# Patient Record
Sex: FEMALE | Race: WHITE | NOT HISPANIC OR LATINO | Employment: OTHER | URBAN - METROPOLITAN AREA
[De-identification: names, ages, dates, MRNs, and addresses within clinical notes are randomized per-mention and may not be internally consistent; named-entity substitution may affect disease eponyms.]

---

## 2020-08-24 ENCOUNTER — HOSPITAL ENCOUNTER (INPATIENT)
Facility: CLINIC | Age: 85
LOS: 3 days | Discharge: HOME HEALTH | DRG: 176 | End: 2020-08-27
Attending: INTERNAL MEDICINE | Admitting: HOSPITALIST
Payer: MEDICARE

## 2020-08-24 ENCOUNTER — APPOINTMENT (OUTPATIENT)
Dept: GENERAL RADIOLOGY | Facility: CLINIC | Age: 85
DRG: 176 | End: 2020-08-24
Attending: EMERGENCY MEDICINE
Payer: MEDICARE

## 2020-08-24 ENCOUNTER — APPOINTMENT (OUTPATIENT)
Dept: CT IMAGING | Facility: CLINIC | Age: 85
DRG: 176 | End: 2020-08-24
Attending: EMERGENCY MEDICINE
Payer: MEDICARE

## 2020-08-24 DIAGNOSIS — M62.81 GENERALIZED MUSCLE WEAKNESS: Primary | ICD-10-CM

## 2020-08-24 DIAGNOSIS — I26.99 OTHER PULMONARY EMBOLISM WITHOUT ACUTE COR PULMONALE, UNSPECIFIED CHRONICITY (H): ICD-10-CM

## 2020-08-24 LAB
ALBUMIN SERPL-MCNC: 3.7 G/DL (ref 3.4–5)
ALP SERPL-CCNC: 92 U/L (ref 40–150)
ALT SERPL W P-5'-P-CCNC: 16 U/L (ref 0–50)
ANION GAP SERPL CALCULATED.3IONS-SCNC: 7 MMOL/L (ref 3–14)
AST SERPL W P-5'-P-CCNC: 22 U/L (ref 0–45)
BASOPHILS # BLD AUTO: 0.1 10E9/L (ref 0–0.2)
BASOPHILS NFR BLD AUTO: 0.3 %
BILIRUB DIRECT SERPL-MCNC: 0.5 MG/DL (ref 0–0.2)
BILIRUB SERPL-MCNC: 2.2 MG/DL (ref 0.2–1.3)
BUN SERPL-MCNC: 15 MG/DL (ref 7–30)
CALCIUM SERPL-MCNC: 9.8 MG/DL (ref 8.5–10.1)
CHLORIDE SERPL-SCNC: 103 MMOL/L (ref 94–109)
CO2 SERPL-SCNC: 25 MMOL/L (ref 20–32)
CREAT SERPL-MCNC: 0.83 MG/DL (ref 0.52–1.04)
D DIMER PPP FEU-MCNC: 1 UG/ML FEU (ref 0–0.5)
DIFFERENTIAL METHOD BLD: ABNORMAL
EOSINOPHIL # BLD AUTO: 0 10E9/L (ref 0–0.7)
EOSINOPHIL NFR BLD AUTO: 0.1 %
ERYTHROCYTE [DISTWIDTH] IN BLOOD BY AUTOMATED COUNT: 13.9 % (ref 10–15)
GFR SERPL CREATININE-BSD FRML MDRD: 62 ML/MIN/{1.73_M2}
GLUCOSE SERPL-MCNC: 105 MG/DL (ref 70–99)
HCT VFR BLD AUTO: 41.1 % (ref 35–47)
HGB BLD-MCNC: 12.9 G/DL (ref 11.7–15.7)
IMM GRANULOCYTES # BLD: 0.1 10E9/L (ref 0–0.4)
IMM GRANULOCYTES NFR BLD: 0.4 %
INR PPP: 1.02 (ref 0.86–1.14)
LIPASE SERPL-CCNC: 103 U/L (ref 73–393)
LYMPHOCYTES # BLD AUTO: 1.1 10E9/L (ref 0.8–5.3)
LYMPHOCYTES NFR BLD AUTO: 6.8 %
MCH RBC QN AUTO: 30.1 PG (ref 26.5–33)
MCHC RBC AUTO-ENTMCNC: 31.4 G/DL (ref 31.5–36.5)
MCV RBC AUTO: 96 FL (ref 78–100)
MONOCYTES # BLD AUTO: 1.5 10E9/L (ref 0–1.3)
MONOCYTES NFR BLD AUTO: 8.7 %
NEUTROPHILS # BLD AUTO: 14.1 10E9/L (ref 1.6–8.3)
NEUTROPHILS NFR BLD AUTO: 83.7 %
NRBC # BLD AUTO: 0 10*3/UL
NRBC BLD AUTO-RTO: 0 /100
NT-PROBNP SERPL-MCNC: 840 PG/ML (ref 0–1800)
PLATELET # BLD AUTO: 208 10E9/L (ref 150–450)
POTASSIUM SERPL-SCNC: 3.8 MMOL/L (ref 3.4–5.3)
PROT SERPL-MCNC: 8.8 G/DL (ref 6.8–8.8)
RBC # BLD AUTO: 4.29 10E12/L (ref 3.8–5.2)
SODIUM SERPL-SCNC: 135 MMOL/L (ref 133–144)
TROPONIN I SERPL-MCNC: <0.015 UG/L (ref 0–0.04)
WBC # BLD AUTO: 16.8 10E9/L (ref 4–11)

## 2020-08-24 PROCEDURE — U0003 INFECTIOUS AGENT DETECTION BY NUCLEIC ACID (DNA OR RNA); SEVERE ACUTE RESPIRATORY SYNDROME CORONAVIRUS 2 (SARS-COV-2) (CORONAVIRUS DISEASE [COVID-19]), AMPLIFIED PROBE TECHNIQUE, MAKING USE OF HIGH THROUGHPUT TECHNOLOGIES AS DESCRIBED BY CMS-2020-01-R: HCPCS | Performed by: EMERGENCY MEDICINE

## 2020-08-24 PROCEDURE — 12000000 ZZH R&B MED SURG/OB

## 2020-08-24 PROCEDURE — C9803 HOPD COVID-19 SPEC COLLECT: HCPCS

## 2020-08-24 PROCEDURE — 96376 TX/PRO/DX INJ SAME DRUG ADON: CPT

## 2020-08-24 PROCEDURE — 71275 CT ANGIOGRAPHY CHEST: CPT

## 2020-08-24 PROCEDURE — 85610 PROTHROMBIN TIME: CPT | Performed by: HOSPITALIST

## 2020-08-24 PROCEDURE — 25000128 H RX IP 250 OP 636: Performed by: EMERGENCY MEDICINE

## 2020-08-24 PROCEDURE — 25000128 H RX IP 250 OP 636: Performed by: PHYSICIAN ASSISTANT

## 2020-08-24 PROCEDURE — 99285 EMERGENCY DEPT VISIT HI MDM: CPT | Mod: 25

## 2020-08-24 PROCEDURE — 80076 HEPATIC FUNCTION PANEL: CPT | Performed by: INTERNAL MEDICINE

## 2020-08-24 PROCEDURE — 25000132 ZZH RX MED GY IP 250 OP 250 PS 637: Mod: GY | Performed by: PHYSICIAN ASSISTANT

## 2020-08-24 PROCEDURE — 25000128 H RX IP 250 OP 636: Performed by: INTERNAL MEDICINE

## 2020-08-24 PROCEDURE — 85379 FIBRIN DEGRADATION QUANT: CPT | Performed by: INTERNAL MEDICINE

## 2020-08-24 PROCEDURE — 36415 COLL VENOUS BLD VENIPUNCTURE: CPT | Performed by: PHYSICIAN ASSISTANT

## 2020-08-24 PROCEDURE — 71045 X-RAY EXAM CHEST 1 VIEW: CPT

## 2020-08-24 PROCEDURE — 99223 1ST HOSP IP/OBS HIGH 75: CPT | Mod: AI | Performed by: PHYSICIAN ASSISTANT

## 2020-08-24 PROCEDURE — 25000132 ZZH RX MED GY IP 250 OP 250 PS 637: Mod: GY | Performed by: HOSPITALIST

## 2020-08-24 PROCEDURE — 25000125 ZZHC RX 250: Performed by: EMERGENCY MEDICINE

## 2020-08-24 PROCEDURE — 93005 ELECTROCARDIOGRAM TRACING: CPT | Mod: 76

## 2020-08-24 PROCEDURE — 96365 THER/PROPH/DIAG IV INF INIT: CPT | Mod: 59

## 2020-08-24 PROCEDURE — 84145 PROCALCITONIN (PCT): CPT | Performed by: PHYSICIAN ASSISTANT

## 2020-08-24 PROCEDURE — 83880 ASSAY OF NATRIURETIC PEPTIDE: CPT | Performed by: INTERNAL MEDICINE

## 2020-08-24 PROCEDURE — 85025 COMPLETE CBC W/AUTO DIFF WBC: CPT | Performed by: INTERNAL MEDICINE

## 2020-08-24 PROCEDURE — 84484 ASSAY OF TROPONIN QUANT: CPT | Performed by: INTERNAL MEDICINE

## 2020-08-24 PROCEDURE — 80048 BASIC METABOLIC PNL TOTAL CA: CPT | Performed by: INTERNAL MEDICINE

## 2020-08-24 PROCEDURE — 85610 PROTHROMBIN TIME: CPT | Performed by: INTERNAL MEDICINE

## 2020-08-24 PROCEDURE — 96375 TX/PRO/DX INJ NEW DRUG ADDON: CPT

## 2020-08-24 PROCEDURE — 93005 ELECTROCARDIOGRAM TRACING: CPT

## 2020-08-24 PROCEDURE — 83690 ASSAY OF LIPASE: CPT | Performed by: INTERNAL MEDICINE

## 2020-08-24 RX ORDER — WARFARIN SODIUM 2.5 MG/1
2.5 TABLET ORAL ONCE
Status: COMPLETED | OUTPATIENT
Start: 2020-08-25 | End: 2020-08-24

## 2020-08-24 RX ORDER — IOPAMIDOL 755 MG/ML
500 INJECTION, SOLUTION INTRAVASCULAR ONCE
Status: COMPLETED | OUTPATIENT
Start: 2020-08-24 | End: 2020-08-24

## 2020-08-24 RX ORDER — ACETAMINOPHEN 325 MG/1
325-650 TABLET ORAL EVERY 6 HOURS PRN
COMMUNITY

## 2020-08-24 RX ORDER — AMOXICILLIN 250 MG
2 CAPSULE ORAL 2 TIMES DAILY
Status: DISCONTINUED | OUTPATIENT
Start: 2020-08-24 | End: 2020-08-27 | Stop reason: HOSPADM

## 2020-08-24 RX ORDER — HEPARIN SODIUM 10000 [USP'U]/100ML
1000 INJECTION, SOLUTION INTRAVENOUS CONTINUOUS
Status: DISCONTINUED | OUTPATIENT
Start: 2020-08-24 | End: 2020-08-25

## 2020-08-24 RX ORDER — LIDOCAINE 40 MG/G
CREAM TOPICAL
Status: DISCONTINUED | OUTPATIENT
Start: 2020-08-24 | End: 2020-08-24

## 2020-08-24 RX ORDER — ACETAMINOPHEN 325 MG/1
650 TABLET ORAL EVERY 4 HOURS PRN
Status: DISCONTINUED | OUTPATIENT
Start: 2020-08-24 | End: 2020-08-27 | Stop reason: HOSPADM

## 2020-08-24 RX ORDER — AMOXICILLIN 250 MG
1 CAPSULE ORAL 2 TIMES DAILY
Status: DISCONTINUED | OUTPATIENT
Start: 2020-08-24 | End: 2020-08-27 | Stop reason: HOSPADM

## 2020-08-24 RX ORDER — MORPHINE SULFATE 4 MG/ML
2 INJECTION, SOLUTION INTRAMUSCULAR; INTRAVENOUS ONCE
Status: COMPLETED | OUTPATIENT
Start: 2020-08-24 | End: 2020-08-24

## 2020-08-24 RX ORDER — NALOXONE HYDROCHLORIDE 0.4 MG/ML
.1-.4 INJECTION, SOLUTION INTRAMUSCULAR; INTRAVENOUS; SUBCUTANEOUS
Status: DISCONTINUED | OUTPATIENT
Start: 2020-08-24 | End: 2020-08-25

## 2020-08-24 RX ORDER — MORPHINE SULFATE 2 MG/ML
2-4 INJECTION, SOLUTION INTRAMUSCULAR; INTRAVENOUS
Status: DISCONTINUED | OUTPATIENT
Start: 2020-08-24 | End: 2020-08-25

## 2020-08-24 RX ADMIN — SODIUM CHLORIDE 73 ML: 9 INJECTION, SOLUTION INTRAVENOUS at 18:14

## 2020-08-24 RX ADMIN — MORPHINE SULFATE 2 MG: 4 INJECTION INTRAVENOUS at 19:02

## 2020-08-24 RX ADMIN — HEPARIN SODIUM 1000 UNITS/HR: 10000 INJECTION, SOLUTION INTRAVENOUS at 19:23

## 2020-08-24 RX ADMIN — MORPHINE SULFATE 2 MG: 2 INJECTION, SOLUTION INTRAMUSCULAR; INTRAVENOUS at 22:23

## 2020-08-24 RX ADMIN — DOCUSATE SODIUM AND SENNOSIDES 1 TABLET: 8.6; 5 TABLET, FILM COATED ORAL at 22:25

## 2020-08-24 RX ADMIN — IOPAMIDOL 52 ML: 755 INJECTION, SOLUTION INTRAVENOUS at 18:14

## 2020-08-24 RX ADMIN — WARFARIN SODIUM 2.5 MG: 2.5 TABLET ORAL at 23:47

## 2020-08-24 RX ADMIN — MORPHINE SULFATE 2 MG: 4 INJECTION INTRAVENOUS at 15:47

## 2020-08-24 RX ADMIN — Medication 4600 UNITS: at 19:23

## 2020-08-24 ASSESSMENT — ACTIVITIES OF DAILY LIVING (ADL)
RETIRED_COMMUNICATION: 0-->UNDERSTANDS/COMMUNICATES WITHOUT DIFFICULTY
RETIRED_EATING: 0-->INDEPENDENT
COGNITION: 0 - NO COGNITION ISSUES REPORTED
TOILETING: 0-->INDEPENDENT
SWALLOWING: 0-->SWALLOWS FOODS/LIQUIDS WITHOUT DIFFICULTY
NUMBER_OF_TIMES_PATIENT_HAS_FALLEN_WITHIN_LAST_SIX_MONTHS: 2
AMBULATION: 1-->ASSISTIVE EQUIPMENT
TRANSFERRING: 0-->INDEPENDENT
FALL_HISTORY_WITHIN_LAST_SIX_MONTHS: YES
WHICH_OF_THE_ABOVE_FUNCTIONAL_RISKS_HAD_A_RECENT_ONSET_OR_CHANGE?: AMBULATION
DRESS: 0-->INDEPENDENT
BATHING: 0-->INDEPENDENT

## 2020-08-24 ASSESSMENT — ENCOUNTER SYMPTOMS
NEUROLOGICAL NEGATIVE: 1
SORE THROAT: 0
FEVER: 0
COUGH: 0
BACK PAIN: 0
GASTROINTESTINAL NEGATIVE: 1

## 2020-08-24 NOTE — ED TRIAGE NOTES
Pt developed sharp right chest pain last nigh tbefore bed.  Pt says the pain is worse with breathing and radiates to the back.  Pt reports that she was awake more than asleep last night due to the pain.

## 2020-08-24 NOTE — ED PROVIDER NOTES
"  History   Chief Complaint  Chest Pain    HPI   Geni Lindsay is a 89 year old female who presents for evaluation of sharp, right sided chest pain. She states her pain started before she went to bed yesterday, with increase in pain through the night that disturbed her sleep. She denies any recent fall or history of this pain. She notes that her pain is worsened with inspiration and reports significant pain that, \"takes her breath away.\" She denies any radiation to her back. She states the pain is constant. She denies any fevers, cough, sore throat, leg swelling. She states she is tender at the location of her pain which is reproducible.    Patient notes that she moved here form [place] 2 years ago and notes that she had been seen at [facility] once for hearing. While in [place], she had an abnormal ECG and she was told \"her heart doesn't pump as well as it should,\" however notes that she has never been diagnosed or placed on medications for any condition.    Allergies  Sulfa Drugs    Medications  The patient is currently on no regular medications.    Past Medical History  The patient denies any significant past medical history.    Past Surgical History  Tonsillectomy  D and C of Uterus    Family History  No past pertinent family history.    Social History  Tobacco use: Never smoker  Alcohol use: Not currently  Drug use: Never  Marital Status: Not reviewed    Review of Systems   Constitutional: Negative for fever.   HENT: Negative for sore throat.    Respiratory: Negative for cough.    Cardiovascular: Positive for chest pain (worse with inhalation). Negative for leg swelling.   Gastrointestinal: Negative.    Genitourinary: Negative.    Musculoskeletal: Negative for back pain.   Neurological: Negative.    All other systems reviewed and are negative.    Physical Exam     Patient Vitals for the past 24 hrs:   BP Temp Temp src Pulse Resp SpO2 Height Weight   08/24/20 1900 (!) 168/73 -- -- -- " "-- -- 1.651 m (5' 5\") --   08/24/20 1830 -- -- -- 93 -- 98 % -- --   08/24/20 1800 (!) 140/69 -- -- 78 -- 97 % -- --   08/24/20 1730 (!) 145/67 -- -- 87 -- 97 % -- --   08/24/20 1700 (!) 153/65 -- -- 90 -- 97 % -- --   08/24/20 1630 (!) 156/78 -- -- 87 -- 98 % -- --   08/24/20 1600 (!) 166/78 -- -- 89 -- 96 % -- --   08/24/20 1530 (!) 178/96 -- -- 92 -- 100 % -- --   08/24/20 1357 (!) 166/78 99.3  F (37.4  C) Oral 105 16 97 % -- 58 kg (127 lb 13.9 oz)       Physical Exam    General: Patient is alert and interactive when I enter the room, uncomfortable   Head:  The scalp, face, and head appear normal  Eyes:  Conjunctivae are normal  ENT:    The nose is normal    Pinnae are normal    External acoustic canals are normal  Neck:  Trachea midline  CV:  Pulses are normal, RRR    Resp:  No respiratory distress, CTAB    Abdomen:      Soft, non-tender, non-distended  Musc:  Normal muscular tone    No major joint effusions    No asymmetric leg swelling    Tenderness under right breast  Skin:  No rash or lesions noted  Neuro:  Speech is normal and fluent. Face is symmetric.     Moving all extremities well.   Psych: Awake. Alert.  Normal affect.  Appropriate interactions.      Emergency Department Course   EKG  Indication: Chest Pain  Time: 1415  Rate 90 bpm. IN interval 114. QRS duration 80. QT/QTc 348/425.   Normal sinus rhythm with sinus arrhythmia  ST and T wave abnormality, consider anterolateral  Abnormal ECG   No prior for comparison  Read time: 1416  Read by Marcia Boyer MD    EKG #2  Indication: Chest Pain  Time: 1605  Rate 93 bpm. IN interval 126. QRS duration 80. QT/QTc 348/432.   Normal sinus rhythm  ST and T wave abnormality, consider inferior ischemia  ST and T wave abnormality, consider anterolateral ischemia   Abnormal EKG  No prior for comparison  Read time 1606  Read by Marcia Boyer MD    Imaging:  Radiology findings were communicated with the patient who voiced understanding of the findings.    CT " Chest Pulmonary Embolism w Contrast  IMPRESSION:  1.  Pulmonary embolism is present, minimal clot burden with single embolus in the right middle lobe. No evidence for elevated right heart pressure. There may be some underlying pre-existing pulmonary hypertension.  2.  Small right pleural effusion and right middle lobe consolidation suspicious for pulmonary infarct. Pneumonia considered less likely.    XR Chest Port 1 View  IMPRESSION: Coarsened interstitial markings not remarkable for age. No  lobar consolidation, pneumothorax, or pleural effusion.    Readings per Radiology    Laboratory:  Laboratory findings were communicated with the patient who voiced understanding of the findings.    CBC: WBC: 16.8 (high), HGB: 12.9, PLT: 208  CMP: Glucose 105 (high), o/w WNL (Creatinine: 0.83)  Hepatic Panel: Bilirubin Direct: 0.5 (high), Bilirubin Total: 2.2 (high), o/w WNL  Lipase: 103  D dimer: 1.0 (high)  Troponin I (Collected at 1531): <0.015  BNP: 840    Heparin 10A level (x3): standing    Asymptomatic COVID-19 Virus: pending    Interventions:  1547 morphine 2 mg IV  1902 morphine 2 mg IV  1923 heparin 25,000 units IV  1923 heparin 4,600 units IV    Emergency Department Course:  Past medical records, nursing notes, and vitals reviewed.    1600 I physically examined the patient as documented above.    A Nasopharyngeal swab was obtained here in the ED.  EKG obtained in the ED, see results above.   IV was inserted and blood was drawn for laboratory testing, results above.  The patient was sent for radiographs while in the emergency department, results above.     1800 I rechecked the patient and discussed the findings of their workup thus far.     1849 I consulted with Radiology, regarding the patient's history and presentation here in the emergency department. I was informed of a detected PE.    1904 Findings and plan explained to the Patient who consents to admission. Discussed the patient with Dr. Krueger, Hospitalist, who  will admit the patient to a inpatient medicine bed for further monitoring, evaluation, and treatment.    I personally reviewed the laboratory and imaging results with the Patient and answered all related questions prior to admission.     Impression & Plan   Covid-19  Geni Lindsay was evaluated during a global COVID-19 pandemic, which necessitated consideration that the patient might be at risk for infection with the SARS-CoV-2 virus that causes COVID-19.   Applicable protocols for evaluation were followed during the patient's care.   COVID-19 was considered as part of the patient's evaluation. The plan for testing is:  a test was obtained during this visit.    Medical Decision Making:  Geni Lindsay is a 89 year old female who presents for evaluation of chest pain.  ECG here shows lateral T wave inversions however unclear if these are old or new.. Blood work of the patient returned largely reassuring aside from elevated an elevated D dimer to 1.0 and an elevated white blood cell count to 16.8. Subsequent chest CT revealed a right middle lobe embolus.  He does also have a pulmonary infarct on CT.  No clinical symptoms of pneumonia.  Patient remained hemodynamically stable and pain improved after morphine.  This was discussed with the patient. Patient was started on heparin.. I will admit the patient to medicine team for further workup and evaluation.  There is no indication at this point for thrombolysis.      Critical Care Time: was 0 minutes for this patient excluding procedures    Diagnosis:    ICD-10-CM    1. Other pulmonary embolism without acute cor pulmonale, unspecified chronicity (H)  I26.99 Asymptomatic COVID-19 Virus (Coronavirus) by PCR       Disposition:  Admitted to inpatient medicine.    Scribe Disclosure:  MARY JANE, Minh Lobo, am serving as a scribe at 4:00 PM on 8/24/2020 to document services personally performed by Marcia Boyer MD based on my observations and the provider's statements  to me.      Marcia Boyer MD  08/24/20 [id number]

## 2020-08-24 NOTE — ED NOTES
"Pt began calling out in pain, moaning and groaning, exclaiming \"it's these huge boobs.\" Pt was repositioned for comfort, reports pain is worse when not sitting upright. Repeat EKG taken. Provider notified.  "

## 2020-08-25 ENCOUNTER — APPOINTMENT (OUTPATIENT)
Dept: CARDIOLOGY | Facility: CLINIC | Age: 85
DRG: 176 | End: 2020-08-25
Attending: PHYSICIAN ASSISTANT
Payer: MEDICARE

## 2020-08-25 ENCOUNTER — APPOINTMENT (OUTPATIENT)
Dept: PHYSICAL THERAPY | Facility: CLINIC | Age: 85
DRG: 176 | End: 2020-08-25
Attending: PHYSICIAN ASSISTANT
Payer: MEDICARE

## 2020-08-25 LAB
ALBUMIN SERPL-MCNC: 2.7 G/DL (ref 3.4–5)
ALP SERPL-CCNC: 80 U/L (ref 40–150)
ALT SERPL W P-5'-P-CCNC: 12 U/L (ref 0–50)
ANION GAP SERPL CALCULATED.3IONS-SCNC: 4 MMOL/L (ref 3–14)
AST SERPL W P-5'-P-CCNC: 16 U/L (ref 0–45)
BILIRUB DIRECT SERPL-MCNC: 0.3 MG/DL (ref 0–0.2)
BILIRUB SERPL-MCNC: 1.4 MG/DL (ref 0.2–1.3)
BUN SERPL-MCNC: 13 MG/DL (ref 7–30)
CALCIUM SERPL-MCNC: 8.6 MG/DL (ref 8.5–10.1)
CHLORIDE SERPL-SCNC: 104 MMOL/L (ref 94–109)
CO2 SERPL-SCNC: 27 MMOL/L (ref 20–32)
CREAT SERPL-MCNC: 0.89 MG/DL (ref 0.52–1.04)
ERYTHROCYTE [DISTWIDTH] IN BLOOD BY AUTOMATED COUNT: 14 % (ref 10–15)
GFR SERPL CREATININE-BSD FRML MDRD: 56 ML/MIN/{1.73_M2}
GLUCOSE SERPL-MCNC: 95 MG/DL (ref 70–99)
HCT VFR BLD AUTO: 35.8 % (ref 35–47)
HGB BLD-MCNC: 11.1 G/DL (ref 11.7–15.7)
INR PPP: 1.28 (ref 0.86–1.14)
INTERPRETATION ECG - MUSE: NORMAL
INTERPRETATION ECG - MUSE: NORMAL
LMWH PPP CHRO-ACNC: 0.52 IU/ML
LMWH PPP CHRO-ACNC: 0.54 IU/ML
LMWH PPP CHRO-ACNC: 0.79 IU/ML
MCH RBC QN AUTO: 30.2 PG (ref 26.5–33)
MCHC RBC AUTO-ENTMCNC: 31 G/DL (ref 31.5–36.5)
MCV RBC AUTO: 98 FL (ref 78–100)
PLATELET # BLD AUTO: 171 10E9/L (ref 150–450)
POTASSIUM SERPL-SCNC: 3.9 MMOL/L (ref 3.4–5.3)
PROCALCITONIN SERPL-MCNC: 0.1 NG/ML
PROT SERPL-MCNC: 6.7 G/DL (ref 6.8–8.8)
RBC # BLD AUTO: 3.67 10E12/L (ref 3.8–5.2)
SODIUM SERPL-SCNC: 135 MMOL/L (ref 133–144)
WBC # BLD AUTO: 14.8 10E9/L (ref 4–11)

## 2020-08-25 PROCEDURE — 25000128 H RX IP 250 OP 636: Performed by: PHYSICIAN ASSISTANT

## 2020-08-25 PROCEDURE — 25000128 H RX IP 250 OP 636: Performed by: HOSPITALIST

## 2020-08-25 PROCEDURE — 97530 THERAPEUTIC ACTIVITIES: CPT | Mod: GP | Performed by: PHYSICAL THERAPIST

## 2020-08-25 PROCEDURE — 85610 PROTHROMBIN TIME: CPT | Performed by: PHYSICIAN ASSISTANT

## 2020-08-25 PROCEDURE — 25000132 ZZH RX MED GY IP 250 OP 250 PS 637: Mod: GY | Performed by: INTERNAL MEDICINE

## 2020-08-25 PROCEDURE — 85027 COMPLETE CBC AUTOMATED: CPT | Performed by: PHYSICIAN ASSISTANT

## 2020-08-25 PROCEDURE — 99233 SBSQ HOSP IP/OBS HIGH 50: CPT | Performed by: INTERNAL MEDICINE

## 2020-08-25 PROCEDURE — 80048 BASIC METABOLIC PNL TOTAL CA: CPT | Performed by: PHYSICIAN ASSISTANT

## 2020-08-25 PROCEDURE — 97116 GAIT TRAINING THERAPY: CPT | Mod: GP | Performed by: PHYSICAL THERAPIST

## 2020-08-25 PROCEDURE — 85520 HEPARIN ASSAY: CPT | Performed by: PHYSICIAN ASSISTANT

## 2020-08-25 PROCEDURE — 25500064 ZZH RX 255 OP 636: Performed by: HOSPITALIST

## 2020-08-25 PROCEDURE — 36415 COLL VENOUS BLD VENIPUNCTURE: CPT | Performed by: PHYSICIAN ASSISTANT

## 2020-08-25 PROCEDURE — 93306 TTE W/DOPPLER COMPLETE: CPT | Mod: 26 | Performed by: INTERNAL MEDICINE

## 2020-08-25 PROCEDURE — 85520 HEPARIN ASSAY: CPT | Performed by: HOSPITALIST

## 2020-08-25 PROCEDURE — 25000132 ZZH RX MED GY IP 250 OP 250 PS 637: Mod: GY | Performed by: PHYSICIAN ASSISTANT

## 2020-08-25 PROCEDURE — 97161 PT EVAL LOW COMPLEX 20 MIN: CPT | Mod: GP | Performed by: PHYSICAL THERAPIST

## 2020-08-25 PROCEDURE — 40000264 ECHOCARDIOGRAM COMPLETE

## 2020-08-25 PROCEDURE — 36415 COLL VENOUS BLD VENIPUNCTURE: CPT | Performed by: HOSPITALIST

## 2020-08-25 PROCEDURE — 12000000 ZZH R&B MED SURG/OB

## 2020-08-25 PROCEDURE — 80076 HEPATIC FUNCTION PANEL: CPT | Performed by: PHYSICIAN ASSISTANT

## 2020-08-25 RX ORDER — HEPARIN SODIUM 10000 [USP'U]/100ML
950 INJECTION, SOLUTION INTRAVENOUS CONTINUOUS
Status: DISCONTINUED | OUTPATIENT
Start: 2020-08-25 | End: 2020-08-25

## 2020-08-25 RX ORDER — NALOXONE HYDROCHLORIDE 0.4 MG/ML
.1-.4 INJECTION, SOLUTION INTRAMUSCULAR; INTRAVENOUS; SUBCUTANEOUS
Status: DISCONTINUED | OUTPATIENT
Start: 2020-08-25 | End: 2020-08-27 | Stop reason: HOSPADM

## 2020-08-25 RX ORDER — IBUPROFEN 400 MG/1
400 TABLET, FILM COATED ORAL EVERY 4 HOURS PRN
Status: DISCONTINUED | OUTPATIENT
Start: 2020-08-25 | End: 2020-08-27 | Stop reason: HOSPADM

## 2020-08-25 RX ADMIN — Medication 2.5 MG: at 21:49

## 2020-08-25 RX ADMIN — HUMAN ALBUMIN MICROSPHERES AND PERFLUTREN 3 ML: 10; .22 INJECTION, SOLUTION INTRAVENOUS at 15:00

## 2020-08-25 RX ADMIN — ACETAMINOPHEN 650 MG: 325 TABLET, FILM COATED ORAL at 16:55

## 2020-08-25 RX ADMIN — MORPHINE SULFATE 2 MG: 2 INJECTION, SOLUTION INTRAMUSCULAR; INTRAVENOUS at 04:17

## 2020-08-25 RX ADMIN — MORPHINE SULFATE 2 MG: 2 INJECTION, SOLUTION INTRAMUSCULAR; INTRAVENOUS at 12:35

## 2020-08-25 RX ADMIN — RIVAROXABAN 15 MG: 15 TABLET, FILM COATED ORAL at 16:55

## 2020-08-25 RX ADMIN — DOCUSATE SODIUM AND SENNOSIDES 2 TABLET: 8.6; 5 TABLET, FILM COATED ORAL at 21:49

## 2020-08-25 ASSESSMENT — ACTIVITIES OF DAILY LIVING (ADL)
ADLS_ACUITY_SCORE: 15
ADLS_ACUITY_SCORE: 14
ADLS_ACUITY_SCORE: 15
ADLS_ACUITY_SCORE: 14

## 2020-08-25 ASSESSMENT — MIFFLIN-ST. JEOR: SCORE: 1001.03

## 2020-08-25 NOTE — ED NOTES
"[facility]  ED Nurse Handoff Report    Geni Lindsay is a 89 year old female   ED Chief complaint: Chest Pain  . ED Diagnosis:   Final diagnoses:   Other pulmonary embolism without acute cor pulmonale, unspecified chronicity (H)     Allergies:   Allergies   Allergen Reactions     Sulfa Drugs        Code Status: Full Code  Activity level - Baseline/Home:  Stand by Assist. Activity Level - Current:   Assist X 1. Lift room needed: No. Bariatric: No   Needed: No   Isolation: No. Infection: COVID19 pending     Vital Signs:   Vitals:    08/24/20 1730 08/24/20 1800 08/24/20 1830 08/24/20 1900   BP: (!) 145/67 (!) 140/69     Pulse: 87 78 93    Resp:       Temp:       TempSrc:       SpO2: 97% 97% 98%    Weight:       Height:    1.651 m (5' 5\")       Cardiac Rhythm:  ,   Cardiac  Cardiac Rhythm: Normal sinus rhythm  Pain level: 0-10 Pain Scale: 9  Patient confused: Yes. Patient Falls Risk: Yes.   Elimination Status: Has voided   Patient Report - Initial Complaint: Chest Pain. Focused Assessment: Pt presents with chest pain that started last night. Pt reports she spent the afternoon shredding papers while sitting at the edge of the bed. Pain came on suddenly prior to going to sleep. Pt took Tylenol without relief, and unable to sleep. Pain is constant with intermittent episodes of sharp pain on right side of chest, worse with deep breaths. ABCs intact, A&Ox4.   Tests Performed:   Labs Ordered and Resulted from Time of ED Arrival Up to the Time of Departure from the ED   CBC WITH PLATELETS DIFFERENTIAL - Abnormal; Notable for the following components:       Result Value    WBC 16.8 (*)     MCHC 31.4 (*)     Absolute Neutrophil 14.1 (*)     Absolute Monocytes 1.5 (*)     All other components within normal limits   BASIC METABOLIC PANEL - Abnormal; Notable for the following components:    Glucose 105 (*)     All other components within normal limits   HEPATIC PANEL - Abnormal; Notable for the " following components:    Bilirubin Direct 0.5 (*)     Bilirubin Total 2.2 (*)     All other components within normal limits   D DIMER QUANTITATIVE - Abnormal; Notable for the following components:    D Dimer 1.0 (*)     All other components within normal limits   TROPONIN I   LIPASE   NT PROBNP INPATIENT   COVID-19 VIRUS (CORONAVIRUS) BY PCR   PLATELETS MONITORED PER HEPARIN TREATMENT PROTOCOL (FOR MEANINGFUL USE   PULSE OXIMETRY NURSING   CARDIAC CONTINUOUS MONITORING   PERIPHERAL IV CATHETER   MEASURE WEIGHT   NOTIFY PHYSICIAN   NOTIFY PHYSICIAN     CT Chest Pulmonary Embolism w Contrast   Final Result   IMPRESSION:   1.  Pulmonary embolism is present, minimal clot burden with single embolus in the right middle lobe. No evidence for elevated right heart pressure. There may be some underlying pre-existing pulmonary hypertension.   2.  Small right pleural effusion and right middle lobe consolidation suspicious for pulmonary infarct. Pneumonia considered less likely.   3.  Results were discussed with Dr. Marcia Boyer at 6:49 PM 08/24/2020.      XR Chest Port 1 View   Final Result   IMPRESSION: Coarsened interstitial markings not remarkable for age. No   lobar consolidation, pneumothorax, or pleural effusion.      CHAR LARIOS MD        Treatments provided: IV morphine, IV Heparin (see MAR)  Family Comments: Niece at bedside  OBS brochure/video discussed/provided to patient:  Yes  ED Medications:   Medications   heparin 25,000 units in 0.45% NaCl 250 mL ANTICOAGULANT  infusion (has no administration in time range)   heparin ANTICOAGULANT  Loading Dose bolus dose from infusion pump 4,600 Units (has no administration in time range)   morphine (PF) injection 2 mg (2 mg Intravenous Given 8/24/20 1547)   iopamidol (ISOVUE-370) solution 500 mL (52 mLs Intravenous Given 8/24/20 1814)   CT Scan Flush (73 mLs Intravenous Given 8/24/20 1814)   morphine (PF) injection 2 mg (2 mg Intravenous Given 8/24/20 1902)     Drips  infusing:  No  For the majority of the shift, the patient's behavior Green. Interventions performed were none.    Sepsis treatment initiated: No       ED Nurse Name/Phone Number: Sola Arshad RN,   7:10 PM    RECEIVING UNIT ED HANDOFF REVIEW    Above ED Nurse Handoff Report was reviewed: Yes  Reviewed by: Poppy Rodríguez RN on August 24, 2020 at 8:32 PM

## 2020-08-25 NOTE — PHARMACY-ADMISSION MEDICATION HISTORY
Admission medication history interview status for this patient is complete. See [facility] admission navigator for allergy information, prior to admission medications and immunization status.     Medication history interview done via telephone during Covid-19 pandemic, indicate source(s): Patient  Medication history resources (including written lists, pill bottles, clinic record): Care Everywhere  Pharmacy: none  Changes made to PTA medication list:  Added: acetaminophen  Deleted: -  Changed: -    Actions taken by pharmacist (provider contacted, etc): Called pt to verify home med list     Additional medication history information: Pt only takes APAP PRN at home.    Medication reconciliation/reorder completed by provider prior to medication history?  N   (Y/N)     For patients on insulin therapy: N  (Y/N)      Prior to Admission medications    Medication Sig Last Dose Taking? Auth Provider   acetaminophen (TYLENOL) 325 MG tablet Take 325-650 mg by mouth every 6 hours as needed for mild pain 8/24/2020 at am Yes Unknown, Entered By History

## 2020-08-25 NOTE — PLAN OF CARE
Pt is a/o, up with A1 and walker. VSS. Pain in right chest area, gave morphine x1- no relief. Appetite good. Voiding. Heparin running at 9.5 ml/hr. Plan to have echo today. Discharge TBD

## 2020-08-25 NOTE — PHARMACY-ANTICOAGULATION SERVICE
Clinical Pharmacy - Warfarin Dosing Consult     Pharmacy has been consulted to manage this patient s warfarin therapy.  Indication: DVT/ PE Treatment  Therapy Goal: INR 2-3  Warfarin Prior to Admission: No  Significant drug interactions: Heparin drip  Dose Comments: 2.5mg [facility]   Pharmacy will monitor Geni Lindsay daily and order warfarin doses to achieve specified goal.      Please contact pharmacy as soon as possible if the warfarin needs to be held for a procedure or if the warfarin goals change.

## 2020-08-25 NOTE — H&P
History and Physical     Geni Lindsay MRN# [id number]   YOB: 1929 Age: 89 year old      Date of Admission:  8/24/2020    Primary care provider: No Ref-Primary, Physician          Assessment and Plan:   Geni Lindsay is a 89 year old female who is pretty spry with no significant PMH, on no medications, who presents with acute complaint of right-sided pleuritic chest pain that started yesterday.  She is a decent historian and states that she had been in normal state of health although she did have a mechanical fall possibly 6 weeks ago.  States she has been getting around okay and has been trying to use a walker for stability.  She was doing well until yesterday when she bent over and had acute right-sided chest pain and pleuritic symptoms.  Due to the ongoing symptoms she presented to the emergency room for further evaluation.  Work-up in the emergency room reveals mild hypertension, white count elevated 16.8K, and low-grade temperature of 99.5. CT scan of the chest shows positive right middle lobe infiltrate with concerns for pulmonary infarct and less likely infiltrate.  Patient was started on heparin drip and was admitted to the hospital for further cares.    1.  Acute right middle lobe pulmonary embolism with concerns for possible pulmonary infarction --cause of PE unclear.  Patient did state she fell 6 weeks ago and was not moving as well after so might be related to period of immobility.  Currently calves are supple and nontender, does not appear to have DVT.  No previous history in the past for DVT PE.  --Continue IV heparin for now, given her decreased gait stability, discussed with her and her niece regarding starting warfarin rather than NOAC due to ease of quick reversibility with vitamin K.  (Patient and niece agrees)  --Pharmacy to dose heparin as well as transition to warfarin dosing  --Consider outpatient follow-up with hematology    2.  Leukocytosis-she has not had any fever  "chills URI symptoms to suggest viral or bacterial infection/PNA.  Suspect elevated  WBC due to acute PE with pulmonary infarction.   --We will hold IV antibiotics for now,  check procalcitonin  --Follow labs  --Consider starting antibiotics and check blood cultures if she does start to spike temperature    3.  EKG changes-EKG changes possible ST-T wave changes in the inferior lateral leads.  No other cardiac history is known as the patient moved from [place] 2 years ago.  -- Patient denies CAD, but did state that they told me that \"my heart does not beat well\"  --Troponin is negative, so not suspicious for RV heart strain  --Given EKG finding will get baseline echocardiogram     4.  Mildly elevated total bili-unclear significance.  No abdominal pain repeat LFTs in a.m.    5. Concern for possible gait instability--Will ask PT for eval.   6. Nose lesion--crusty lesion at the top of her nose, will need outpt Derm follow for BX to r/o cancer    Admit to inpatient status  CODE STATUS-DNR/DNI discussed with patient  DVT prophylaxis--currently on heparin drip with transition to warfarin   Disposition-unclear likely will need a couple of days         Chief Complaint:   Right sided cp       History of Present Illness:   Geni Lindsay is a 89 year old female who is pretty spry with no significant PMH, on no medications, who presents with acute complaint of right-sided pleuritic chest pain that started yesterday.  She is a decent historian and states that she had been in normal state of health although she did have a mechanical fall possibly 6 weeks ago.  States she has been getting around okay and has been trying to use a walker for stability.  She was doing well until yesterday when she bent over (while looking over her tax papers) and experienced acute right-sided chest pain and pleuritic symptoms.  States it hurts when she takes a deep breath, no fever or chills. Feels like a knife hitting into her. No hx of " DVT/PE. No recent trauma except falling and sprang her rigth ankle 6-8 weeks ago. Does not believe she was laid up too long.      Due to the ongoing symptoms she presented to the emergency room for further evaluation.  Work-up in the emergency room reveals mild hypertension, white count elevated 16.8K, and low-grade temperature of 99.5. CT scan of the chest shows positive right middle lobe infiltrate with concerns for pulmonary infarct and less likely infiltrate.  Patient was started on heparin drip and was admitted to the hospital for further cares.  She moved here from [place] 2 yrs ago after her  . She has been to [facility] once since he moved here.          Past Medical History:   Denies any chronic medical illness          Past Surgical History:   Reviewed and no contributory          Social History:     Social History     Socioeconomic History     Marital status:      Spouse name: Not on file     Number of children: Not on file     Years of education: Not on file     Highest education level: Not on file   Occupational History     Not on file   Social Needs     Financial resource strain: Not on file     Food insecurity     Worry: Not on file     Inability: Not on file     Transportation needs     Medical: Not on file     Non-medical: Not on file   Tobacco Use     Smoking status: Not on file   Substance and Sexual Activity     Alcohol use: Not on file     Drug use: Not on file     Sexual activity: Not on file   Lifestyle     Physical activity     Days per week: Not on file     Minutes per session: Not on file     Stress: Not on file   Relationships     Social connections     Talks on phone: Not on file     Gets together: Not on file     Attends [employer] service: Not on file     Active member of club or organization: Not on file     Attends meetings of clubs or organizations: Not on file     Relationship status: Not on file     Intimate partner violence     Fear of current or ex partner: Not on file      "Emotionally abused: Not on file     Physically abused: Not on file     Forced sexual activity: Not on file   Other Topics Concern     Not on file   Social History Narrative     Not on file               Family History:   No family history on file.           Allergies:      Allergies   Allergen Reactions     Sulfa Drugs                Medications:     Prior to Admission medications    Medication Sig Last Dose Taking? Auth Provider   acetaminophen (TYLENOL) 325 MG tablet Take 325-650 mg by mouth every 6 hours as needed for mild pain 8/24/2020 at am Yes Unknown, Entered By History              Review of Systems:     A Comprehensive greater than 10 system review of systems was carried out.  Pertinent positives and negatives are noted above.  Otherwise negative for contributory information.            Physical Exam:   Blood pressure (!) 150/56, pulse 87, temperature 100.3  F (37.9  C), temperature source Oral, resp. rate 16, height 1.651 m (5' 5\"), weight 58 kg (127 lb 13.9 oz), SpO2 95 %.  Exam:  GENERAL:  Comfortable.  PSYCH: pleasant, oriented, No acute distress.  HEENT:  PERRLA. Normal conjunctiva, normal hearing, nasal mucosa and Oropharynx are normal.  NECK:  Supple, no neck vein distention, adenopathy or bruits, normal thyroid.  HEART:  Normal S1, S2 with no murmur, no pericardial rub, gallops or S3 or S4.  LUNGS:  Clear to auscultation, normal Respiratory effort. No wheezing, rales or ronchi.  ABDOMEN:  Soft, no hepatosplenomegaly, normal bowel sounds. Non-tender, non distended.   EXTREMITIES:  No pedal edema, +2 pulses bilateral and equal.  SKIN:  Dry to touch, No rash, wound or ulcerations.  NEUROLOGIC:  CN 2-12 intact, BL 5/5 symmetric upper and lower extremity strength, sensation is intact with no focal deficits.             Data:     Recent Labs   Lab 08/24/20  1531   WBC 16.8*   HGB 12.9   HCT 41.1   MCV 96        Recent Labs   Lab 08/24/20  1531      POTASSIUM 3.8   CHLORIDE 103   CO2 25 "   ANIONGAP 7   *   BUN 15   CR 0.83   GFRESTIMATED 62   GFRESTBLACK 72   DOMINIC 9.8     Recent Labs   Lab 08/24/20  1531   INR 1.02     Recent Labs   Lab 08/24/20  1531   TROPI <0.015         Results for orders placed or performed during the hospital encounter of 08/24/20   XR Chest Port 1 View    Narrative    CHEST ONE VIEW PORTABLE   8/24/2020 4:30 PM     HISTORY: Chest pain.    COMPARISON: None.      Impression    IMPRESSION: Coarsened interstitial markings not remarkable for age. No  lobar consolidation, pneumothorax, or pleural effusion.    CHAR LARIOS MD   CT Chest Pulmonary Embolism w Contrast    Narrative    EXAM: CT CHEST PULMONARY EMBOLISM W CONTRAST  LOCATION: [facility]  DATE/TIME: 8/24/2020 6:09 PM    INDICATION:  positive D-dimer  See the Clinical Information for Interpreting Provider  COMPARISON: None.  TECHNIQUE: CT chest pulmonary angiogram during arterial phase injection of IV contrast. Multiplanar reformats and MIP reconstructions were performed. Dose reduction techniques were used.   CONTRAST: 52mL Isovue-370    FINDINGS:  ANGIOGRAM CHEST: Pulmonary embolism is present, single embolus in the right middle lobe and medial segment pulmonary arterial branches. Overall minimal clot burden. Pulmonary arteries mildly dilated. Thoracic aorta normal in caliber. No aortic dissection   or other acute abnormality.    HEART: Mild four-chamber cardiomegaly. No pericardial effusion.    LUNGS AND PLEURA: Small right pleural effusion and adjacent atelectasis. Mild groundglass density within the right middle lobe suspicious for infarct. No pulmonary mass. No suspicious pulmonary nodule. No pleural effusion or pneumothorax.    MEDIASTINUM: No adenopathy or mass.    LIMITED UPPER ABDOMEN: Negative.    MUSCULOSKELETAL: Diffuse osteopenia. Prominent dorsal kyphosis. Mild thoracic scoliosis.      Impression    IMPRESSION:  1.  Pulmonary embolism is present, minimal clot burden with single embolus  in the right middle lobe. No evidence for elevated right heart pressure. There may be some underlying pre-existing pulmonary hypertension.  2.  Small right pleural effusion and right middle lobe consolidation suspicious for pulmonary infarct. Pneumonia considered less likely.  3.  Results were discussed with Dr. Marcia Boyer at 6:49 PM 08/24/2020.         Jada Klein PA-C

## 2020-08-25 NOTE — PLAN OF CARE
"BP (!) 150/56   Pulse 87   Temp 100.3  F (37.9  C) (Oral)   Resp 16   Ht 1.651 m (5' 5\")   Wt 58 kg (127 lb 13.9 oz)   SpO2 95%   BMI 21.28 kg/m    ORIENTATION:A/Ox4  VS:Tmax 100.3  PAIN:6/10 rt. Rib cage- PRN morphine given x1  TELE:SR  IV:hep gtt infusing @ 10 ml/hr  RESPIRATORY:LS- diminished, SOB  GI:+BS  :Voiding appropriately  SKIN:Bruised, scabs   ACTIVITY:SBA gait belt and walker  DIET:Regular  PLAN: Continue Hep gtt, pain control, continue POC.  "

## 2020-08-25 NOTE — PLAN OF CARE
End of Shift Note:  For VS and complete assessments, please see documentation flowsheets.    COVID status: PENDING     Pertinent shift assessments: A&Ox4. Pt c/o right sided pleuritic pain only with deep inspiration. LS with fine crackles in RM/RLL. Pt reports some ALSTON. No chest pressure reported. On hep gtt tonight. Slept well tonight. Pulse ox audible w/ sats >92% on room air. Tele SR with inverted T waves. VSS. Scab noted on bridge of nose. Pt independently repositions in bed. Pt up the bathroom with GB and walker this shift, pain much more intense with movement. IV morphine given x1 w/ relief.    Treatment Plan: Hep gtt, improve pain, ECHO    Expected Discharge Date/Disposition: TBD

## 2020-08-25 NOTE — PROGRESS NOTES
"[facility]  Hospitalist Progress Note  Naveen Esquivel MD 08/25/2020    Reason for Stay (Diagnosis): pulmonary embolism         Assessment and Plan:      Summary of Stay: Geni Lindsay is a 89 year old female who came to attention on 8/24/2020 with right sided chest pain that had onset the day before coming in.     In the ED, Ms. Lindsay was hemodynamically stable and without hypoxia breathing room air. Her examination was negative for clear pathology, but bilirubin was 2.2, WBC 16.8 with left shift, D-dimer 1.0, EKG revealed T-wave inversions over the entire precordium.  CT chest \"Pulmonary embolism is present, minimal clot burden with single embolus in the right middle lobe. No evidence for elevated right heart pressure. There may be some underlying pre-existing pulmonary hypertension...Small right pleural effusion and right middle lobe consolidation suspicious for pulmonary infarct. Pneumonia considered less likely.\"    Ms Lindsay was admitted on a heparin drip and has tolerated this well.  I spoke with her niece, Mylene, today and got some corroborating information.      Problem List:   1. Pulmonary embolism with pulm infarction.  Likely precipitated by recent fall and period of less-than-usual activity.  2. Falls.   3. Likely basal cell cancer on the nose.    PLAN:  1.  Continues on heparin.  Request Pharmacy liaison to look for appropriate DOAC coverage. Rivaroxaban 15 mg bid may be slightly generous for this patient; for that reason and considering her obvious stability, I have opted to start her on a maintenance dose of 20 mg daily without the initial 15 mg twice daily.  Expect 3-6 months anticoagulation.  2.  No need for heme eval at this time.    3.  Ibuprofen and oxycodone are ordered for pain control.    DVT Prophylaxis: on heparin drip transitioning to DOAC  Code Status: DNR / DNI  Discharge Dispo: tbd  Estimated Disch Date / # of Days until Disch: 1-2 likely        Interval History " "(Subjective):      Chart reviewed, pt interviewed.    I found the patient to be somewhat difficult to communicate with.  Her niece, who happened to arrive at the same time as I did indicated that she also thought the patient was mildly confused beyond her baseline.      According to Mylene, the niece, Ms. Lindsay is independent and seems to be doing very well in independent living.  She takes care of her aunt bills and medications.  She has not had difficulty with ambulating typically.    Patient tells me that she was awakened multiple times in the night due to chest pain.                  Physical Exam:      Last Vital Signs:  /41 (BP Location: Left arm)   Pulse 74   Temp 97.5  F (36.4  C) (Oral)   Resp 20   Ht 1.651 m (5' 5\")   Wt 58 kg (127 lb 14.4 oz)   SpO2 96%   BMI 21.28 kg/m        Constitutional: Awake, alert, cooperative, no apparent distress   Respiratory: Clear to auscultation bilaterally, no crackles or wheezing.  Pain limits deep breath.   Cardiovascular: Regular rate and rhythm, normal S1 and S2, and no murmur noted   Abdomen: Normal bowel sounds, soft, non-distended, non-tender   Skin: No rashes, no cyanosis, dry to touch   Neuro: Alert and oriented x3.  Very hard of hearing.   Extremities: No edema.  Mildly tender in the right calf.   Other(s):        All other systems: Negative          Medications:      All current medications were reviewed with changes reflected in problem list.         Data:      All new lab and imaging data was reviewed.   Labs/Imaging:  Results for orders placed or performed during the hospital encounter of 08/24/20 (from the past 24 hour(s))   CT Chest Pulmonary Embolism w Contrast    Narrative    EXAM: CT CHEST PULMONARY EMBOLISM W CONTRAST  LOCATION: [facility]  DATE/TIME: 8/24/2020 6:09 PM    INDICATION:  positive D-dimer  See the Clinical Information for Interpreting Provider  COMPARISON: None.  TECHNIQUE: CT chest pulmonary angiogram during " arterial phase injection of IV contrast. Multiplanar reformats and MIP reconstructions were performed. Dose reduction techniques were used.   CONTRAST: 52mL Isovue-370    FINDINGS:  ANGIOGRAM CHEST: Pulmonary embolism is present, single embolus in the right middle lobe and medial segment pulmonary arterial branches. Overall minimal clot burden. Pulmonary arteries mildly dilated. Thoracic aorta normal in caliber. No aortic dissection   or other acute abnormality.    HEART: Mild four-chamber cardiomegaly. No pericardial effusion.    LUNGS AND PLEURA: Small right pleural effusion and adjacent atelectasis. Mild groundglass density within the right middle lobe suspicious for infarct. No pulmonary mass. No suspicious pulmonary nodule. No pleural effusion or pneumothorax.    MEDIASTINUM: No adenopathy or mass.    LIMITED UPPER ABDOMEN: Negative.    MUSCULOSKELETAL: Diffuse osteopenia. Prominent dorsal kyphosis. Mild thoracic scoliosis.      Impression    IMPRESSION:  1.  Pulmonary embolism is present, minimal clot burden with single embolus in the right middle lobe. No evidence for elevated right heart pressure. There may be some underlying pre-existing pulmonary hypertension.  2.  Small right pleural effusion and right middle lobe consolidation suspicious for pulmonary infarct. Pneumonia considered less likely.  3.  Results were discussed with Dr. Marcia Boyer at 6:49 PM 08/24/2020.   Procalcitonin   Result Value Ref Range    Procalcitonin 0.10 ng/ml   Heparin 10a Level   Result Value Ref Range    Heparin 10A Level 0.79 IU/mL   Basic metabolic panel   Result Value Ref Range    Sodium 135 133 - 144 mmol/L    Potassium 3.9 3.4 - 5.3 mmol/L    Chloride 104 94 - 109 mmol/L    Carbon Dioxide 27 20 - 32 mmol/L    Anion Gap 4 3 - 14 mmol/L    Glucose 95 70 - 99 mg/dL    Urea Nitrogen 13 7 - 30 mg/dL    Creatinine 0.89 0.52 - 1.04 mg/dL    GFR Estimate 56 (L) >60 mL/min/[1.73_m2]    GFR Estimate If Black 65 >60 mL/min/[1.73_m2]     Calcium 8.6 8.5 - 10.1 mg/dL   CBC with platelets   Result Value Ref Range    WBC 14.8 (H) 4.0 - 11.0 10e9/L    RBC Count 3.67 (L) 3.8 - 5.2 10e12/L    Hemoglobin 11.1 (L) 11.7 - 15.7 g/dL    Hematocrit 35.8 35.0 - 47.0 %    MCV 98 78 - 100 fl    MCH 30.2 26.5 - 33.0 pg    MCHC 31.0 (L) 31.5 - 36.5 g/dL    RDW 14.0 10.0 - 15.0 %    Platelet Count 171 150 - 450 10e9/L   Hepatic panel   Result Value Ref Range    Bilirubin Direct 0.3 (H) 0.0 - 0.2 mg/dL    Bilirubin Total 1.4 (H) 0.2 - 1.3 mg/dL    Albumin 2.7 (L) 3.4 - 5.0 g/dL    Protein Total 6.7 (L) 6.8 - 8.8 g/dL    Alkaline Phosphatase 80 40 - 150 U/L    ALT 12 0 - 50 U/L    AST 16 0 - 45 U/L   INR   Result Value Ref Range    INR 1.28 (H) 0.86 - 1.14   Heparin Xa (10a) Level   Result Value Ref Range    Heparin 10A Level 0.52 IU/mL   Heparin 10a Level   Result Value Ref Range    Heparin 10A Level 0.54 IU/mL   Echocardiogram Complete    Narrative    [id number]  [id number]  [id number]  [id number]^NATALIIA^DARIN^[facility]  Echocardiography Laboratory  [address] [place] [address]        Name: ANDREA MEJIA  MRN: [id number]  : 1929  Study Date: 2020 02:18 PM  Age: 89 yrs  Gender: Female  Patient Location: [facility]  Reason For Study: Pericardial Effusion  Ordering Physician: DARIN WILLIAM  Performed By: Casandra Lange     BSA: 1.6 m2  Height: 65 in  Weight: 127 lb  HR: 90  BP: 150/56 mmHg  _____________________________________________________________________________  __        Procedure  Complete Portable Echo Adult. Optison (NDC #[id number]) given intravenously.  Technically difficult study.  _____________________________________________________________________________  __        Interpretation Summary     Technically difficult study.     Left ventricular systolic function is normal.The visual ejection fraction is  estimated at 60-65%.  The right ventricular systolic function is normal.  There is moderate to  mod-severe (2-3+) tricuspid regurgitation.There is mild  to moderate (1-2+) aortic regurgitation.  Pulmonary hypertension- RVSP 51 mm hg +RA.  The inferior vena cava was normal in size with preserved respiratory  variability.     No prior study for comparision.  _____________________________________________________________________________  __        Left Ventricle  The left ventricle is normal in size. There is normal left ventricular wall  thickness. Diastolic Doppler findings (E/E' ratio and/or other parameters)  suggest left ventricular filling pressures are normal. Left ventricular  systolic function is normal. The visual ejection fraction is estimated at 60-  65%. No regional wall motion abnormalities noted.     Right Ventricle  The right ventricle is normal size. The right ventricular systolic function is  normal.     Atria  The left atrium is mildly dilated. The right atrium is mildly dilated.     Mitral Valve  There is trace mitral regurgitation.        Tricuspid Valve  There is moderate to mod-severe (2-3+) tricuspid regurgitation. Pulmonary  hypertension. The right ventricular systolic pressure is approximated at 51.4  mmHg plus the right atrial pressure.     Aortic Valve  The aortic valve is not well visualized. There is mild to moderate (1-2+)  aortic regurgitation. No aortic stenosis is present.     Pulmonic Valve  The pulmonic valve is not well visualized. There is no pulmonic valvular  stenosis.     Vessels  The aortic root is normal size. The inferior vena cava was normal in size with  preserved respiratory variability.     Pericardium  There is no pericardial effusion. epicardial fat noted.     _____________________________________________________________________________  __  MMode/2D Measurements & Calculations  IVSd: 1.2 cm  LVIDd: 3.5 cm  LVIDs: 2.5 cm  LVPWd: 1.0 cm  FS: 28.5 %     LV mass(C)d: 114.9 grams  LV mass(C)dI: 70.5 grams/m2  Ao root diam: 2.8 cm  LA dimension: 3.6 cm  LA/Ao: 1.3  LVOT  diam: 1.8 cm  LVOT area: 2.6 cm2  LA Volume (BP): 46.0 ml  LA Volume Index (BP): 28.2 ml/m2  RWT: 0.58        Doppler Measurements & Calculations  MV E max elvin: 57.8 cm/sec  MV A max elvin: 100.2 cm/sec  MV E/A: 0.58  MV dec time: 0.25 sec     AI P1/2t: 357.5 msec  LV V1 max P.1 mmHg  LV V1 max: 142.5 cm/sec  LV V1 VTI: 28.4 cm  SV(LVOT): 74.7 ml  SI(LVOT): 45.8 ml/m2  PA acc time: 0.11 sec  TR max elvin: 358.3 cm/sec  TR max P.4 mmHg  E/E' av.3  Lateral E/e': 6.2  Medial E/e': 6.5           _____________________________________________________________________________  __           Report approved by: Forest Nunez 2020 03:40 PM

## 2020-08-25 NOTE — PLAN OF CARE
Discharge Planner PT   Patient plan for discharge: wants to return home when pain is better  Current status: PT: Order received; Initial evaluation completed and treatment initiated. At baseline patient lives in her own apt and reports independence with mobility and cares; use of a 4WW; 1 recent fall reported; Niece present at start and end of session; On eval patient having R sided chest pain from new PE; was having L leg/ankle cramps earlier but those have subsided per patient report; needing min A and HOB elevated to tolerate getting up to EOB; sit>stand with min A and use of walker; min A to walk slowly to bedside chair for late lunch; CGA to safely return to sitting; denies dizziness; DANIEL; MD present at end of session and aware of progress and current need for TCU  Barriers to return to prior living situation: lives alone; falls risk; pain; unable to care for self in current condition  Recommendations for discharge: TCU  Rationale for recommendations: Patient currently below reported baseline level of function and significantly limited by pain from PE; Would be unable this date to care for self at home in her current condition; Patient would benefit from ongoing skilled PT to address deficits with mobility to assist patient in her eventual return to home alone.       Entered by: Amy Varma 08/25/2020 3:31 PM

## 2020-08-25 NOTE — PHARMACY-RX INSURANCE COVERAGE
Anticoagulation coverage check.  Patient has Medicare D through [employer] with $435 (of $435) unmet deductible.    Xarelto/Eliquis  August:  Upon receipt of RX, Discharge Pharmacy can dispense 1 month free.  September: $482  (fulfills $435 deductible)  October-Dec: $47/mo    Enoxaparin 80mg x 10 syringes: Requires Prior Auth for BID dosing.  After approved, $121.    Jantoven (warfarin)  $5/mo      -SHI Cooper, Pharmacy Technician/Liaison, Discharge Pharmacy [phone]

## 2020-08-25 NOTE — PLAN OF CARE
"/52 (BP Location: Left arm)   Pulse 89   Temp 97.2  F (36.2  C) (Oral)   Resp 20   Ht 1.651 m (5' 5\")   Wt 58 kg (127 lb 14.4 oz)   SpO2 96%   BMI 21.28 kg/m    ORIENTATION:A/Ox4  PAIN:C/O 8/10 right sided rib pain- PRN tylenol given x1 and PRN oxy given x1  TELE:SR w/ inverted Ts  IV:SL  RESPIRATORY:LS- diminished, SOB  GI:+BS  :Voiding appropriately  SKIN:Bruised, scabs  ACTIVITY:Ax1 gait belt and walker  DIET:Regular diet  PLAN: Pt transitioned to Xarelto this shift, hep gtt discontinued, pain control, PT following, discharge plan 1-2 days, continue POC.   "

## 2020-08-26 ENCOUNTER — APPOINTMENT (OUTPATIENT)
Dept: PHYSICAL THERAPY | Facility: CLINIC | Age: 85
DRG: 176 | End: 2020-08-26
Payer: MEDICARE

## 2020-08-26 LAB
INR PPP: 1.29 (ref 0.86–1.14)
SARS-COV-2 RNA SPEC QL NAA+PROBE: NOT DETECTED
SPECIMEN SOURCE: NORMAL

## 2020-08-26 PROCEDURE — 25000132 ZZH RX MED GY IP 250 OP 250 PS 637: Mod: GY | Performed by: PHYSICIAN ASSISTANT

## 2020-08-26 PROCEDURE — 25000132 ZZH RX MED GY IP 250 OP 250 PS 637: Mod: GY | Performed by: INTERNAL MEDICINE

## 2020-08-26 PROCEDURE — 99232 SBSQ HOSP IP/OBS MODERATE 35: CPT | Performed by: INTERNAL MEDICINE

## 2020-08-26 PROCEDURE — 97116 GAIT TRAINING THERAPY: CPT | Mod: GP | Performed by: PHYSICAL THERAPIST

## 2020-08-26 PROCEDURE — 12000000 ZZH R&B MED SURG/OB

## 2020-08-26 PROCEDURE — 97530 THERAPEUTIC ACTIVITIES: CPT | Mod: GP | Performed by: PHYSICAL THERAPIST

## 2020-08-26 PROCEDURE — 36415 COLL VENOUS BLD VENIPUNCTURE: CPT | Performed by: PHYSICIAN ASSISTANT

## 2020-08-26 PROCEDURE — 85610 PROTHROMBIN TIME: CPT | Performed by: PHYSICIAN ASSISTANT

## 2020-08-26 RX ADMIN — RIVAROXABAN 20 MG: 20 TABLET, FILM COATED ORAL at 17:35

## 2020-08-26 RX ADMIN — DOCUSATE SODIUM AND SENNOSIDES 1 TABLET: 8.6; 5 TABLET, FILM COATED ORAL at 08:00

## 2020-08-26 ASSESSMENT — ACTIVITIES OF DAILY LIVING (ADL)
ADLS_ACUITY_SCORE: 15

## 2020-08-26 NOTE — PLAN OF CARE
Alert/oriented. Tele SR. Lungs clear. Ambulated 3 halls, O2 sat 97%. Stopped x1 to rest for a few minutes. Ambulated x2 more times day shift. Some pain with deep inspiration to chest but much less than yesterday. Refused tylenol. Senna given. BM today. PO intake good/voids w/o difficulty. Xaralto started yesterday. PT to see today, however the patient may be able to discharge directly back to the [facility]. Apt.   Plan: possible discharge tomorrow back to home with possible PT.

## 2020-08-26 NOTE — PROGRESS NOTES
Discharge Planner   Discharge Plans in progress: yes  Barriers to discharge plan: patient will need HC but does not have PCP.  CTS arranged for PCP telephonic appointment for 8/28/20 at 1 :20 PM with Dr. Obregon.  Information was placed on AVS.    Follow up plan: CTS updated SW and she will follow up with patient melissa    Santa Escalona RN, BSN, PHN, CTS  Care Coordinator  [facility]  [phone]          Entered by: Santa Escalona 08/26/2020 12:07 PM

## 2020-08-26 NOTE — PLAN OF CARE
PT:   Timed Up and Go (TUG): TUG is a test of basic mobility skills. It is used to screen individuals prone to falls.  Gait assistive device used: FWW     Patient score 15 seconds  ?13.5 seconds indicate at risk for falls in older adults according to Alonzo et al 2000.  ?30 seconds - indicates dependency in most ADL and mobility skills according to Delfina & Ike 1991  >11.5 seconds indicate at risk for falls in adults with Parkinson's Disease    Minimal Detectable Change for patients with Alzheimer s = 4.09 sec   Minimal Detectable Change for patients with Parkinson s Disease = 3.5 sec   according to María & Wilfredo Davila 2011    Assessment (rationale for performing, application to patient s function & care plan):   (Minutes billed as physical performance test)     Five Times Sit to Stand Test: (FTSTST): The FTSTST measures functional LE strength and provides information about postural control during transitions to/from standing.     Overall findings: WNL  Patient Score:  14 seconds    Performance is considered within normal limits for times:  <10 seconds for people aged < 60 years with balance and vestibular disorders  <14.2 seconds for people aged > 60 years with balance and vestibular disorders  >15 seconds for community-dwelling elderly individuals in their 80s has been correlated with an increased likelihood of a prior history of falls.     Minimal Detectable Change = 2.5 sec  Minimal Detectable Change = 8.4 sec (sit <> stand x 10 rep) (hemodialysis)    according to Milton Rowland & Kandy 2009        Assessment (rationale for performing, application to patient s function & care plan): Assist with discharge planning and recommendation for continued use of AD.

## 2020-08-26 NOTE — PROGRESS NOTES
"[facility]  Hospitalist Progress Note  Naveen Esquivel MD 08/26/2020    Reason for Stay (Diagnosis): pulmonary embolism         Assessment and Plan:      Summary of Stay: Geni Lindsay is a 89 year old female who came to attention on 8/24/2020 with right sided chest pain that had onset the day before coming in.     In the ED, Ms. Lindsay was hemodynamically stable and without hypoxia breathing room air. Her examination was negative for clear pathology, but bilirubin was 2.2, WBC 16.8 with left shift, D-dimer 1.0, EKG revealed T-wave inversions over the entire precordium.  CT chest \"Pulmonary embolism is present, minimal clot burden with single embolus in the right middle lobe. No evidence for elevated right heart pressure. There may be some underlying pre-existing pulmonary hypertension...Small right pleural effusion and right middle lobe consolidation suspicious for pulmonary infarct. Pneumonia considered less likely.\"    Ms Lindsay was admitted on a heparin drip and has tolerated this well.  I spoke with her niece, Mylene, today and got some corroborating information.      Problem List:   1. Pulmonary embolism with pulm infarction.  Likely precipitated by recent fall and period of less-than-usual activity.  Pain is markedly improved.  2. Mod-severe TR, pHTN, mild-mod AR.  3. Falls.   4. Likely basal cell cancer on the nose.    PLAN:  1.  Continues Xarelto 20 mg daily.  2.  No need for heme eval at this time.    3.  Ibuprofen and oxycodone are ordered for pain control.    DVT Prophylaxis: on heparin drip transitioning to DOAC  Code Status: DNR / DNI  Discharge Dispo: Return to home  Estimated Disch Date / # of Days until Disch: Tomorrow likely        Interval History (Subjective):      Patient today is much more comfortable and interactive.  She was up several times with the nurses and ambulated comfortably in the halls.    Ms. Lindsay indicates that her chest pain is essentially resolved.          " "        Physical Exam:      Last Vital Signs:  BP (!) 160/61 (BP Location: Left arm)   Pulse 83   Temp 98.5  F (36.9  C) (Oral)   Resp 18   Ht 1.651 m (5' 5\")   Wt 58 kg (127 lb 14.4 oz)   SpO2 97%   BMI 21.28 kg/m        Constitutional: Awake, alert, cooperative, no apparent distress   Respiratory: Clear to auscultation bilaterally, no crackles or wheezing.     Cardiovascular: Regular rate and rhythm, normal S1 and S2, and no murmur noted   Abdomen: Normal bowel sounds, soft, non-distended, non-tender   Skin: No rashes, no cyanosis, dry to touch   Neuro: Alert and oriented x3.  Very hard of hearing.   Extremities: No edema.     Other(s):        All other systems: Negative          Medications:      All current medications were reviewed with changes reflected in problem list.         Data:      All new lab and imaging data was reviewed.   Labs/Imaging:  Results for orders placed or performed during the hospital encounter of 08/24/20 (from the past 24 hour(s))   INR   Result Value Ref Range    INR 1.29 (H) 0.86 - 1.14   Social Work IP Consult    Narrative    Maddy Jimenez LSW     8/26/2020  5:17 PM  Care Transition Initial Assessment - SW     Met with: Patient  Active Problems:    Pulmonary infarct (H)       DATA  Lives With: alone   Living Arrangements: apartment - [facility]  Quality of Family Relationships: helpful, involved, supportive  Description of Support System: Involved, Supportive  Who is your support system?: Other (specify)(Nieces) - Mylene   ([place], [place]) and Morenita ([place], [place])  Support Assessment: Adequate family and caregiver support,   Adequate social supports. Pt states that she has a lot of support   from her family and friends.  She said that her niece Mylene is her   \"guardian remy.\"  Identified issues/concerns regarding health management: The pt   was admitted for pulmonary infarct.    Resources List: Skilled Nursing Facility vs HC     Quality of Family Relationships: " helpful, involved, supportive  Transportation Anticipated: family or friend will provide    ASSESSMENT  Cognitive Status:  awake, alert and oriented  Concerns to be addressed: Beth met with the pt to discuss discharge   planning.  The pt was sitting in her chair watching tv when sw   arrived.  The pt was in a pleasant mood and contributed   appropriately to the conversation.  The pt said that she is independent at baseline other than   assistance for transportation.  She uses [employer] to assist   with transportation.  The Senior apartment building she lives at,   [facility], also schedules also arranges   transportation 1/week to take the residents shopping as needed.    The pt uses a 4WW at baseline.  She said that she will walk down   the road to [facility] if needed or she walks the halls and stairs   with other residents at her apartment building.  There is an   elevator at her apartment too if she needs that.  The pt is not   on any prescription medications, but does take vitamins.      The pt moved to [place] in 2018 from [place] when her  .    She was open to TCU or HC at discharge.  She requested that beth   talk with her niece Mylene, [phone], regarding her discharge   because Mylene knows more about services in the area.  Beth asked the   pt if she has a PCP and she said that she does not.  She said   that she saw someone for her eyes and someone did a brain   evaluation for her, but that is all.    Beth talked with Mylene regarding the pt's discharge.  Mylene is open   to the pt having HC if needed.  Mylene did confirm that the pt does   not have a PCP.  Beth told Mylene that in order for the pt to have HC   services, she will need a PCP appointment.  Beth told Mylene that the   RN CC can arrange an appointment.  Mylene requested an afternoon   appointment if possible, so she can assist as needed.  Beth asked   about a virtual vs face to face appointment for the pt and Mylene   said that for the  initial appointment, it does not matter.  Mylene   told beth that the pt is not on any prescription meds and that is   why the ot hasn't established with a PCP since she moved to [place].    [facility]ylene [facility] [place].    She said that she would also like to use [facility] if that is the   recommendation at discharge.  Mylene said that she will provide   transport home for the pt.    Beth updated the RN CC and requested that they schedule a PCP   appointment for the pt to establish care.     PLAN  Financial costs for the patient includes - none identified at   this time.  Patient given options and choices for discharge TCU vs HC.  Patient/family is agreeable to the plan?  Yes, choices were   offered for HC.  Transportation/person available to transport on day of discharge    is the pt's niece Mylene.  She is typically available in the   afternoons.  Patient anticipates discharging to: home with HC.    Sw will continue with discharge planning and will be available as   needed until discharge.  Beth will continue to update Mylene with the discharge plan and the   pt's PCP appointment information.    JESSENIA Rowan, [facility]  Inpatient Care Coordination  [facility]  [phone]    ADDENDUM 1355:  The pt will discharge home tomorrow with [facility].  The pt has a PCP   appointment on Friday 8/28 at 1320 via telephone with [facility]   [place] to establish care.  Beth sent the referral to the [facility]   liaison.  Mylene will transport the pt home tomorrow at 1300.

## 2020-08-26 NOTE — PLAN OF CARE
PT: Discharge Planner PT   Patient plan for discharge: Home  Current status: Pt completed bed mobility and transfers with mod indep using WW safely for sit to stand. Ambulated 200ft x 2 with WW and supervision only for path finding, good pace and step length without evidence of balance concerns. Completed Timed up and go and FTSTS (see notes) PT goals met  Barriers to return to prior living situation: none at present level of function  Recommendations for discharge: home with home PT  Rationale for recommendations: Pt demonstrating much improvement from previous day, appears close to baseline for transfers and gait, could benefit from home PT for safety assessment and HEP for strengthening       Entered by: Jennifer Iniguez 08/26/2020 11:38 AM     Physical Therapy Discharge Summary    Reason for therapy discharge:    Discharged to home with home therapy.    Progress towards therapy goal(s). See goals on Care Plan in [facility] electronic health record for goal details.  Goals met    Therapy recommendation(s):    Continued therapy is recommended.  Rationale/Recommendations:  would benefit from home PT for safety eval and HEP.

## 2020-08-26 NOTE — DISCHARGE INSTRUCTIONS
A telephone appointment was scheduled for you with Dr. Obregon  from the [facility] clinic on 8/28/20 at 1:20. Please have your hospital discharge paperwork, ID and insurance information available to review if needed. The clinic will call you at your appointment time. Please call the clinic at [phone] if you need to reschedule.       Your home care referral was sent to [facility].  If you haven't heard from them within the next 24-48 hours,  please call them at [phone].

## 2020-08-26 NOTE — PLAN OF CARE
Patient slept most of night  VSS  Denied chest pain  Maintaining saturation on room air, cont pulse ox maintained  LS: dim  Tele: SR with inverted Ts  Up to BSC with Ax1  Safety maintained

## 2020-08-26 NOTE — CONSULTS
"Care Transition Initial Assessment - SW     Met with: Patient  Active Problems:    Pulmonary infarct (H)       DATA  Lives With: alone   Living Arrangements: apartment - [facility]  Quality of Family Relationships: helpful, involved, supportive  Description of Support System: Involved, Supportive  Who is your support system?: Other (specify)(Nieces) - Mylene ([place], [place]) and Morenita ([place], [place])  Support Assessment: Adequate family and caregiver support, Adequate social supports. Pt states that she has a lot of support from her family and friends.  She said that her niece Mylene is her \"guardian remy.\"  Identified issues/concerns regarding health management: The pt was admitted for pulmonary infarct.    Resources List: Skilled Nursing Facility vs HC     Quality of Family Relationships: helpful, involved, supportive  Transportation Anticipated: family or friend will provide    ASSESSMENT  Cognitive Status:  awake, alert and oriented  Concerns to be addressed: Sw met with the pt to discuss discharge planning.  The pt was sitting in her chair watching tv when sw arrived.  The pt was in a pleasant mood and contributed appropriately to the conversation.  The pt said that she is independent at baseline other than assistance for transportation.  She uses [employer] to assist with transportation.  The Senior apartment building she lives at, [facility], also schedules also arranges transportation 1/week to take the residents shopping as needed.  The pt uses a 4WW at baseline.  She said that she will walk down the road to [employer] if needed or she walks the halls and stairs with other residents at her apartment building.  There is an elevator at her apartment too if she needs that.  The pt is not on any prescription medications, but does take vitamins.      The pt moved to [place] in 2018 from [place] when her  .  She was open to TCU or HC at discharge.  She requested that sw talk " with her niece Mylene, [phone], regarding her discharge because Mylene knows more about services in the area.  Beth asked the pt if she has a PCP and she said that she does not.  She said that she saw someone for her eyes and someone did a brain evaluation for her, but that is all.    Beth talked with Mylene regarding the pt's discharge.  Mylene is open to the pt having HC if needed.  Mylene did confirm that the pt does not have a PCP.  Beth told Mylene that in order for the pt to have HC services, she will need a PCP appointment.  Beth told Mylene that the RN CC can arrange an appointment.  Mylene requested an afternoon appointment if possible, so she can assist as needed.  Beth asked about a virtual vs face to face appointment for the pt and Mylene said that for the initial appointment, it does not matter.  Mylene told beth that the pt is not on any prescription meds and that is why the ot hasn't established with a PCP since she moved to [place].  Mylene requested an appointment with [facility] in [place].  She said that she would also like to use [facility] if that is the recommendation at discharge.  Mylene said that she will provide transport home for the pt.    Beth updated the RN CC and requested that they schedule a PCP appointment for the pt to establish care.     PLAN  Financial costs for the patient includes - none identified at this time.  Patient given options and choices for discharge TCU vs HC.  Patient/family is agreeable to the plan?  Yes, choices were offered for HC.  Transportation/person available to transport on day of discharge  is the pt's niece Mylene.  She is typically available in the afternoons.  Patient anticipates discharging to: home with HC.    Beth will continue with discharge planning and will be available as needed until discharge.  Beth will continue to update Mylene with the discharge plan and the pt's PCP appointment information.    JESSENIA Rowan, [facility]  Inpatient Care Coordination  [facility]  [facility]  [phone]    ADDENDUM 1355:  The pt will discharge home tomorrow with [facility].  The pt has a PCP appointment on Friday 8/28 at 1320 via telephone with [facility] to establish care.  Priya sent the referral to the [facility] liaison.  Mylene will transport the pt home tomorrow at 1300.

## 2020-08-26 NOTE — PROGRESS NOTES
[facility]  Referral received  from care transitions team.  Spoke with pt and also Mylene torres to discuss potential plans for HC.  Discharge date pending. Patient care support center processing referral.  [facility] will follow for final discharge plans.  Home care orders and face to face documentation needed at time of discharge. Anticipate need for RN PT OT.

## 2020-08-27 VITALS
RESPIRATION RATE: 18 BRPM | HEART RATE: 97 BPM | HEIGHT: 65 IN | DIASTOLIC BLOOD PRESSURE: 69 MMHG | TEMPERATURE: 98.9 F | SYSTOLIC BLOOD PRESSURE: 168 MMHG | OXYGEN SATURATION: 94 % | BODY MASS INDEX: 21.31 KG/M2 | WEIGHT: 127.9 LBS

## 2020-08-27 LAB
CREAT SERPL-MCNC: 0.72 MG/DL (ref 0.52–1.04)
GFR SERPL CREATININE-BSD FRML MDRD: 73 ML/MIN/{1.73_M2}

## 2020-08-27 PROCEDURE — 82565 ASSAY OF CREATININE: CPT | Performed by: HOSPITALIST

## 2020-08-27 PROCEDURE — 25000132 ZZH RX MED GY IP 250 OP 250 PS 637: Mod: GY | Performed by: INTERNAL MEDICINE

## 2020-08-27 PROCEDURE — 36415 COLL VENOUS BLD VENIPUNCTURE: CPT | Performed by: HOSPITALIST

## 2020-08-27 PROCEDURE — 99238 HOSP IP/OBS DSCHRG MGMT 30/<: CPT | Performed by: INTERNAL MEDICINE

## 2020-08-27 RX ADMIN — Medication 2.5 MG: at 01:04

## 2020-08-27 ASSESSMENT — ACTIVITIES OF DAILY LIVING (ADL)
ADLS_ACUITY_SCORE: 15

## 2020-08-27 NOTE — PLAN OF CARE
Pt A&Ox4, last temp was 99, BP elevated, RA, had 6/10 epigastric pain, oxy given with relief. A1 with a gb and walker, regular diet. Tele SR w inverted Ts. CM/PT/SW following, should discharge today

## 2020-08-27 NOTE — PLAN OF CARE
A&O. [place], denies pain, up with A-1 with walker and GB, Tele SR, LS clear, PIV SL, PT following, plan to discharge home tomorrow, will continue with POC.

## 2020-08-27 NOTE — DISCHARGE SUMMARY
[facility] Discharge Summary    Geni Lindsay MRN# [id number]   Age: 89 year old YOB: 1929     Date of Admission:  8/24/2020  Date of Discharge::  8/27/2020  Admitting Physician:  Waldemar Krueger MD  Discharge Physician:  Naveen Esquivel MD     Home clinic: [facility]          Admission Diagnoses:   Other pulmonary embolism without acute cor pulmonale, unspecified chronicity (H) [I26.99]          Discharge Diagnosis:   Principal Problem:    Other pulmonary embolism without acute cor pulmonale, unspecified chronicity (H)  Active Problems:    Pulmonary infarct (H)            Procedures:   Chest x-ray  CT chest pulmonary embolism protocol       IMPRESSION:  1.  Pulmonary embolism is present, minimal clot burden with single embolus in the right middle lobe. No evidence for elevated right heart pressure. There may be some underlying pre-existing pulmonary hypertension.  2.  Small right pleural effusion and right middle lobe consolidation suspicious for pulmonary infarct. Pneumonia considered less likely.  Echocardiogram       Left ventricular systolic function is normal.The visual ejection fraction is  estimated at 60-65%.  The right ventricular systolic function is normal.  There is moderate to mod-severe (2-3+) tricuspid regurgitation.There is mild  to moderate (1-2+) aortic regurgitation.  Pulmonary hypertension- RVSP 51 mm hg +RA.  The inferior vena cava was normal in size with preserved respiratory  variability.          Medications Prior to Admission:     Medications Prior to Admission   Medication Sig Dispense Refill Last Dose     acetaminophen (TYLENOL) 325 MG tablet Take 325-650 mg by mouth every 6 hours as needed for mild pain   8/24/2020 at am             Discharge Medications:     Current Discharge Medication List      START taking these medications    Details   rivaroxaban ANTICOAGULANT (XARELTO) 20 MG TABS tablet Take 1 tablet (20 mg) by mouth daily (with  "dinner)  Qty: 30 tablet, Refills: 0    Associated Diagnoses: Other pulmonary embolism without acute cor pulmonale, unspecified chronicity (H)         CONTINUE these medications which have NOT CHANGED    Details   acetaminophen (TYLENOL) 325 MG tablet Take 325-650 mg by mouth every 6 hours as needed for mild pain                   Consultations:   No consultations were requested during this admission          Hospital Course:   Geni Lindsay is a 89 year old female who came to attention on 8/24/2020 with right sided chest pain that had onset the day of admission.      In the ED, Ms. Lindsay was hemodynamically stable and without hypoxia breathing room air. Her examination was negative for clear pathology, but bilirubin was 2.2, WBC 16.8 with left shift, D-dimer 1.0, EKG revealed T-wave inversions over the entire precordium.  CT chest \"Pulmonary embolism is present, minimal clot burden with single embolus in the right middle lobe. No evidence for elevated right heart pressure. There may be some underlying pre-existing pulmonary hypertension...Small right pleural effusion and right middle lobe consolidation suspicious for pulmonary infarct. Pneumonia considered less likely.\"     Ms Lindsay was admitted on a heparin drip and has tolerated this well.  Echocardiogram confirmed the patient had pulmonary hypertension but no right ventricular dysfunction.  She was switched to rivaroxaban for long-term anticoagulation.  I opted to treat her with 20 mg daily from the start rather than beginning with 15 mg twice daily due to her age and my concern for possible excessive anticoagulation.      Initially, Ms. Lindsay was quite uncomfortable with pleuritic chest pain.  This however fortunately resolved over the course of a couple of days.  She proved to be very robust and was able to be active with physical therapy nearly to baseline almost immediately.    BP (!) 168/69 (BP Location: Left arm)   Pulse 97   Temp 98.9  F (37.2 " " C) (Axillary)   Resp 18   Ht 1.651 m (5' 5\")   Wt 58 kg (127 lb 14.4 oz)   SpO2 94%   BMI 21.28 kg/m    On the date of discharge, Ms. Lindsay is alert, coherent and very pleasant.  Notably, she is hard of hearing and sometimes prefers to act as though she understands although she does not hear well enough.   She is in no apparent distress breathing room air.  HEENT: No focal muscular asymmetry.  Neck: No obvious masses are appreciated.  Heart: Regular rate and rhythm without rubs, murmurs or gallops.  Chest: No rales wheezes or egophony.  Good air entry throughout.  No rubs are noted in particular over the right posterior.  Extremities: No remarkable pitting edema noted.  Neurologic: Patient is ambulating comfortably and independently though she did use a walker at the request of physical therapy.          Discharge Instructions and Follow-Up:   Discharge diet: Regular   Discharge activity: Activity as tolerated   Discharge follow-up:  An appointment was made with the primary physician in the next couple of weeks to establish care.  I had an opportunity to talk with the patient's niece, Mylene again today prior to the patient's discharge.           Discharge Disposition:   Discharged to home with Home Care.      Attestation:  I have reviewed today's vital signs, notes, medications, labs and imaging.  Total time: 25 minutes    Naveen Esquivel MD     "

## 2020-08-27 NOTE — PROGRESS NOTES
Per Dr Esquivel and his discussion with Pt Melissa Chakraborty cannot pick pt up to bring home until 1300 today.     Discharge instructions including in depth instruction/review of Xaralto information provided to the pt. The patient stated understanding of all instructions. Will discharge today to home w/HHC - melissa to transport

## 2020-08-27 NOTE — PROGRESS NOTES
Discharge Planner   Discharge Plans in progress: The pt will discharge home with [facility] RN/PT/OT today.  The pt's melissa Chakraborty will provide transport at 1300.    Kiarra spoke with the [facility] liaison who will update the [facility] team.     08/27/20 1000   Final Resources   Resources List Home Care   Home Care [facility] [phone], Fax: [phone]     Barriers to discharge plan: None identified at this time.  Follow up plan: Kiarra will continue with discharge planning and will be available as needed until discharge.       Entered by: Maddy Jimenez 08/27/2020 10:01 AM     JESSENIA Rowan, KIARRA  Inpatient Care Coordination  [facility]  [phone]

## 2020-08-28 ENCOUNTER — VIRTUAL VISIT (OUTPATIENT)
Dept: INTERNAL MEDICINE | Facility: CLINIC | Age: 85
End: 2020-08-28
Payer: MEDICARE

## 2020-08-28 DIAGNOSIS — I26.99 PULMONARY INFARCT (H): ICD-10-CM

## 2020-08-28 DIAGNOSIS — I07.1 MODERATE TRICUSPID REGURGITATION: ICD-10-CM

## 2020-08-28 DIAGNOSIS — Z09 HOSPITAL DISCHARGE FOLLOW-UP: Primary | ICD-10-CM

## 2020-08-28 DIAGNOSIS — I27.20 PHT (PULMONARY HYPERTENSION) (H): ICD-10-CM

## 2020-08-28 DIAGNOSIS — I26.99 OTHER PULMONARY EMBOLISM WITHOUT ACUTE COR PULMONALE, UNSPECIFIED CHRONICITY (H): ICD-10-CM

## 2020-08-28 PROCEDURE — 99442 ZZC PHYSICIAN TELEPHONE EVALUATION 11-20 MIN: CPT | Mod: 95 | Performed by: INTERNAL MEDICINE

## 2020-08-28 RX ORDER — MAGNESIUM OXIDE 400 MG/1
TABLET ORAL
COMMUNITY

## 2020-08-28 SDOH — HEALTH STABILITY: MENTAL HEALTH: HOW OFTEN DO YOU HAVE A DRINK CONTAINING ALCOHOL?: NEVER

## 2020-08-28 ASSESSMENT — ENCOUNTER SYMPTOMS
SHORTNESS OF BREATH: 0
FEVER: 0
FATIGUE: 0
PALPITATIONS: 0
WHEEZING: 0

## 2020-08-28 NOTE — PATIENT INSTRUCTIONS
Patient Education     Discharge Instructions for Pulmonary Embolism  A deep vein thrombosis (DVT) is a blood clot in a large vein deep in a leg, arm, or elsewhere in the body. The clot can separate from the vein, travel to the lungs and cut off blood flow. This is a pulmonary embolism (PE). Pulmonary embolism is very serious and may cause death.   Healthcare providers use the term venous thromboembolism (VTE) to describe both DVT and PE. They use the term VTE because the two conditions are very closely related. And, because their prevention and treatment are closely related.   Home care  Taking care of yourself is very important. To help prevent more blood clots from forming, follow your healthcare provider's instructions. Do the following:    Take your medicines exactly as instructed. Don t skip doses. If you miss a dose, call your healthcare provider and ask what you should do.      Have all lab tests as recommended. This is very important when you take medicines to prevent blood clots.     If your healthcare provider has instructed you to do so, wear elastic (compression stockings).    Get up and get moving.    While sitting for long periods of time, move your knees, ankles, feet, and toes.  Lifestyle changes  To help prevent problems with your heart and blood vessels, do the following:     If you smoke, get help to quit. Talk with your healthcare provider about medicines and programs that can help.    Stay at a healthy weight. Talk to your healthcare provider about losing weight, if you are overweight    Try to exercise at least 30 minutes on most days. Before starting an exercise program, talk with your healthcare provider.     When traveling by car, make frequent stops to get up and move around.    On long airplane rides, get up and move around when possible. If you can t get up, wiggle your toes, move your ankles and tighten your calves to keep your blood moving.  Follow-up care  Make a follow-up appointment  as directed. Have your lab work done as directed.     When to call your healthcare provider  Call your healthcare provider right away if you have:    Pain, swelling, and redness in your leg, arm, or other body area. These symptoms may mean another blood clot.    Blood in your urine    Bleeding with bowel movements    Bleeding from the nose, gums, a cut, or vagina  Call 911  Call 911 if you have symptoms of a blood clot in the lungs:     Chest pain    Trouble breathing    Coughing (may cough up blood)    Fast heartbeat    Sweating    Fainting  Also call 911 if you have:    Heavy or uncontrolled bleeding. If you are taking a blood thinner, you have an increased chance of bleeding.   Date Last Reviewed: 2/1/2017 2000-2019 [employer]. [address] [place] [address], [place] [address]. All rights reserved. This information is not intended as a substitute for professional medical care. Always follow your healthcare professional's instructions.

## 2020-08-28 NOTE — PROGRESS NOTES
"Geni Lindsay is a 89 year old female who is being evaluated via a billable telephone visit.      The patient has been notified of following:     \"This telephone visit will be conducted via a call between you and your physician/provider. We have found that certain health care needs can be provided without the need for a physical exam.  This service lets us provide the care you need with a short phone conversation.  If a prescription is necessary we can send it directly to your pharmacy.  If lab work is needed we can place an order for that and you can then stop by our lab to have the test done at a later time.    Telephone visits are billed at different rates depending on your insurance coverage. During this emergency period, for some insurers they may be billed the same as an in-person visit.  Please reach out to your insurance provider with any questions.    If during the course of the call the physician/provider feels a telephone visit is not appropriate, you will not be charged for this service.\"    Patient has given verbal consent for Telephone visit?  Yes    What phone number would you like to be contacted at? [phone]    How would you like to obtain your AVS? Mail a copy    Subjective     Geni Lindsay is a 89 year old female who presents via phone visit today for the following health issues:    [facility]    Hospital Follow-up Visit:    Hospital/Nursing Home/IP Rehab Facility: [facility]  Date of Admission: 8/24/20  Date of Discharge: 8/27/20  Reason(s) for Admission: pulmonary embolism       Was your hospitalization related to COVID-19? No   Problems taking medications regularly:  None  Medication changes since discharge: added Xarelto 20 mg   Problems adhering to non-medication therapy:  None    Summary of hospitalization:  [facility] discharge summary reviewed  Diagnostic Tests/Treatments reviewed.  Follow up needed: none  Other Healthcare Providers Involved in Patient s " Care:         None  Update since discharge: stable.  Post Discharge Medication Reconciliation: discharge medications reconciled and changed, per note/orders.  Plan of care communicated with patient        Moved from [place] to [place] July 2018.   Brother's daughter Mylene brought her here. Lives alone.    She was in hospital for right sided chest pain. CT chest confirmed PE. Also had pulmonary infarct. Echo showed PHT, moderate TR. Sent home on Xarelto.   Labs showed anemia, elevated white count, trending down in hospital.    Checks BP at home and its runs less than 120.  Seen cardiology in the past for abnormal EKG, no cardiac symptoms currently.    Review of Systems   Constitutional: Negative for fatigue and fever.   Respiratory: Negative for shortness of breath and wheezing.    Cardiovascular: Negative for chest pain and palpitations.         Objective      Vitals:  No vitals were obtained today due to virtual visit.  PSYCH: Alert and oriented times 3; coherent speech, normal   rate and volume, able to articulate logical thoughts, able   to abstract reason, no tangential thoughts, no hallucinations   or delusions  Her affect is normal  RESP: No cough, no audible wheezing, able to talk in full sentences  Remainder of exam unable to be completed due to telephone visits    Assessment/Plan:    Assessment & Plan     Geni was seen today for hospital f/u.    Diagnoses and all orders for this visit:    Hospital discharge follow-up    Other pulmonary embolism without acute cor pulmonale, unspecified chronicity (H)  -     rivaroxaban ANTICOAGULANT (XARELTO) 20 MG TABS tablet; Take 1 tablet (20 mg) by mouth daily (with dinner)    Pulmonary infarct (H)    PHT (pulmonary hypertension) (H)    Moderate tricuspid regurgitation    Continue Xarelto. No concern for fall risk. Discussed about pros and cons of Xarelto.  Continue walker to ambulate.  Continue to monitor cardiac symptoms.    Jeromy Frausto MD  [facility]  [facility]AC[facility]    Phone call duration: 18 minutes

## 2020-09-04 ENCOUNTER — TELEPHONE (OUTPATIENT)
Dept: INTERNAL MEDICINE | Facility: CLINIC | Age: 85
End: 2020-09-04

## 2020-09-04 NOTE — TELEPHONE ENCOUNTER
[facility] now requests orders and shares plan of care/discharge summaries for some patients through [employer].  Please REPLY TO THIS MESSAGE OR ROUTE BACK TO THE AUTHOR in order to give authorization for orders when needed.  This is considered a verbal order, you will still receive a faxed copy of orders for signature.  Thank you for your assistance in improving collaboration for our patients.    ORDER:  PT 1w2, 2w2, 1w1 for L/E strengthening, transfer/gait training, fall prevention.  OT for ADLs, cognitive testing, home safety    Kristina Mohan, PT  [email or web address]  [phone]

## 2020-09-10 ENCOUNTER — TELEPHONE (OUTPATIENT)
Dept: INTERNAL MEDICINE | Facility: CLINIC | Age: 85
End: 2020-09-10

## 2020-09-10 NOTE — TELEPHONE ENCOUNTER
Fax received from [facility] PT and OT 09/04/20 for review and signature.  Put in Dr. Frausto' in basket.

## 2020-09-15 ENCOUNTER — TELEPHONE (OUTPATIENT)
Dept: INTERNAL MEDICINE | Facility: CLINIC | Age: 85
End: 2020-09-15

## 2020-09-16 ENCOUNTER — DOCUMENTATION ONLY (OUTPATIENT)
Dept: OTHER | Facility: CLINIC | Age: 85
End: 2020-09-16

## 2020-09-18 DIAGNOSIS — Z53.9 DIAGNOSIS NOT YET DEFINED: Primary | ICD-10-CM

## 2020-09-18 PROCEDURE — G0180 MD CERTIFICATION HHA PATIENT: HCPCS | Performed by: INTERNAL MEDICINE

## 2020-09-23 ENCOUNTER — TELEPHONE (OUTPATIENT)
Dept: INTERNAL MEDICINE | Facility: CLINIC | Age: 85
End: 2020-09-23

## 2020-09-29 ENCOUNTER — TRANSFERRED RECORDS (OUTPATIENT)
Dept: HEALTH INFORMATION MANAGEMENT | Facility: CLINIC | Age: 85
End: 2020-09-29

## 2020-10-07 ENCOUNTER — TELEPHONE (OUTPATIENT)
Dept: INTERNAL MEDICINE | Facility: CLINIC | Age: 85
End: 2020-10-07

## 2020-10-20 DIAGNOSIS — I26.99 OTHER PULMONARY EMBOLISM WITHOUT ACUTE COR PULMONALE, UNSPECIFIED CHRONICITY (H): ICD-10-CM

## 2020-10-21 NOTE — TELEPHONE ENCOUNTER
Last VV on 8/28/20   Prescription approved per [facility]ill [facility].      Requested Prescriptions   Pending Prescriptions Disp Refills     rivaroxaban ANTICOAGULANT (XARELTO) 20 MG TABS tablet 90 tablet 1     Sig: Take 1 tablet (20 mg) by mouth daily (with dinner)       Direct Oral Anticoagulant Agents Failed - 10/20/2020  4:53 PM        Failed - Creatinine Clearance greater than 50 ml/min on file in past 3 mos     No lab results found.          Passed - Normal Platelets on file in past 12 months     Recent Labs   Lab Test 08/25/20  0702                  Passed - Medication is active on med list        Passed - Serum creatinine less than or equal to 1.4 on file in past 3 mos     Recent Labs   Lab Test 08/27/20  0642   CR 0.72       Ok to refill medication if creatinine is low          Passed - Patient is 18 years of age or older        Passed - No active pregnancy on record        Passed - No positive pregnancy test within past 12 months        Passed - Recent (6 mo) or future (30 days) visit within the authorizing provider's specialty

## 2020-11-25 ENCOUNTER — TELEPHONE (OUTPATIENT)
Dept: INTERNAL MEDICINE | Facility: CLINIC | Age: 85
End: 2020-11-25

## 2020-11-25 NOTE — TELEPHONE ENCOUNTER
10:40a I spoke with Mylene See Geni's Niece. Needing assistance with cost of Xarelto.    No established with [place] to qualify for the Pharmacy Assistance Fund, only virtual visit and fills with Alexandrea.    Geni does not qualify for the Xarelto savings card because she has Medicare Part D [employer] insuranc.    It is to late in 2020 to begin application process to the [place] assistance program for Xarelto.  A reminder letter will be sent to Mylene in January 2021.  We will review at that time.    Jaclyn Rojas  Prescription   Pharmacy Assistance  [phone]

## 2021-02-11 ENCOUNTER — IMMUNIZATION (OUTPATIENT)
Dept: NURSING | Facility: CLINIC | Age: 86
End: 2021-02-11
Payer: MEDICARE

## 2021-02-11 PROCEDURE — 91300 PR COVID VAC PFIZER DIL RECON 30 MCG/0.3 ML IM: CPT

## 2021-02-11 PROCEDURE — 0001A PR COVID VAC PFIZER DIL RECON 30 MCG/0.3 ML IM: CPT

## 2021-03-04 ENCOUNTER — IMMUNIZATION (OUTPATIENT)
Dept: NURSING | Facility: CLINIC | Age: 86
End: 2021-03-04
Payer: MEDICARE

## 2021-03-04 PROCEDURE — 91300 PR COVID VAC PFIZER DIL RECON 30 MCG/0.3 ML IM: CPT

## 2021-03-04 PROCEDURE — 0002A PR COVID VAC PFIZER DIL RECON 30 MCG/0.3 ML IM: CPT

## 2021-03-07 ENCOUNTER — HEALTH MAINTENANCE LETTER (OUTPATIENT)
Age: 86
End: 2021-03-07

## 2021-04-15 ENCOUNTER — VIRTUAL VISIT (OUTPATIENT)
Dept: INTERNAL MEDICINE | Facility: CLINIC | Age: 86
End: 2021-04-15
Payer: MEDICARE

## 2021-04-15 DIAGNOSIS — I26.99 OTHER PULMONARY EMBOLISM WITHOUT ACUTE COR PULMONALE, UNSPECIFIED CHRONICITY (H): Primary | ICD-10-CM

## 2021-04-15 DIAGNOSIS — Z13.220 ENCOUNTER FOR LIPID SCREENING FOR CARDIOVASCULAR DISEASE: ICD-10-CM

## 2021-04-15 DIAGNOSIS — Z13.6 ENCOUNTER FOR LIPID SCREENING FOR CARDIOVASCULAR DISEASE: ICD-10-CM

## 2021-04-15 DIAGNOSIS — R32 URINARY INCONTINENCE, UNSPECIFIED TYPE: ICD-10-CM

## 2021-04-15 DIAGNOSIS — Z13.1 SCREENING FOR DIABETES MELLITUS: ICD-10-CM

## 2021-04-15 DIAGNOSIS — Z83.3 FH: DIABETES MELLITUS: ICD-10-CM

## 2021-04-15 PROCEDURE — 99442 PR PHYSICIAN TELEPHONE EVALUATION 11-20 MIN: CPT | Mod: 95 | Performed by: INTERNAL MEDICINE

## 2021-04-15 ASSESSMENT — ENCOUNTER SYMPTOMS
SHORTNESS OF BREATH: 0
PALPITATIONS: 0

## 2021-04-15 NOTE — PROGRESS NOTES
Geni is a 89 year old who is being evaluated via a billable telephone visit.      What phone number would you like to be contacted at? [phone]  How would you like to obtain your AVS? [employer]    Assessment & Plan   Problem List Items Addressed This Visit        Circulatory    Other pulmonary embolism without acute cor pulmonale, unspecified chronicity (H) - Primary    Relevant Orders    Oncology/Hematology Adult Referral    **CBC with platelets FUTURE anytime    **Comprehensive metabolic panel FUTURE anytime      Other Visit Diagnoses     Urinary incontinence, unspecified type        Relevant Orders    UA with Microscopic reflex to Culture    FH: diabetes mellitus        Relevant Orders    **A1C FUTURE anytime    Screening for diabetes mellitus        Encounter for lipid screening for cardiovascular disease        Relevant Orders    Lipid panel reflex to direct LDL Fasting         Labs ordered.  Hematology referral placed to decide if she needs to continue Xarelto.  Phone number given to the patient.  Her niece usually helps with doctor's appointment.  Patient will give the phone number to her niece to help with appointment.    No follow-ups on file.    Jeromy Frausto MD  [facility]   Geni is a 89 year old who presents for the following health issues : urinary incontinence and urgency. Refill of Xarelto. Loss of appetite and dry mouth.    HPI   She had PE in 8/2020 and taking xarelto.  Likely unprovoked.  Was wondering if she needs to continue to take it.  Strong FH of DM. She had labs done and was negative in the past.  Wanted labs again.  She has urge incontinence. She wears depends/pad to help.  Does not think she needs medication for now.    Review of Systems   Respiratory: Negative for shortness of breath.    Cardiovascular: Negative for chest pain and palpitations.   Genitourinary: Positive for urgency.          Objective       Vitals:  No vitals were  obtained today due to virtual visit.    Physical Exam   PSYCH: Alert and oriented times 3; coherent speech, normal   rate and volume, able to articulate logical thoughts, able   to abstract reason, no tangential thoughts, no hallucinations   or delusions  Her affect is normal  RESP: No cough, no audible wheezing, able to talk in full sentences  Remainder of exam unable to be completed due to telephone visits    Phone call duration: 18 minutes

## 2021-04-16 NOTE — TELEPHONE ENCOUNTER
RECORDS STATUS - ALL OTHER DIAGNOSIS      RECORDS RECEIVED FROM: [facility]   DATE RECEIVED: 4/16/21   NOTES STATUS DETAILS   OFFICE NOTE from referring provider Jeromy Moody MD in [place] IM: 4/15/21   OFFICE NOTE from medical oncologist NA    DISCHARGE SUMMARY from hospital [facility] 8/24/20   DISCHARGE REPORT from the ER NA    OPERATIVE REPORT NA    MEDICATION LIST [facility]    CLINICAL TRIAL TREATMENTS TO DATE     LABS     PATHOLOGY REPORTS NA    ANYTHING RELATED TO DIAGNOSIS Epic 8/27/20   GENONOMIC TESTING     TYPE:     IMAGING (NEED IMAGES & REPORT)     XR PACS 8/24/20: Epic   CT SCANS PACS 8/24/20: [facility]   MRI     MAMMO     ULTRASOUND     PET

## 2021-05-19 ENCOUNTER — TRANSFERRED RECORDS (OUTPATIENT)
Dept: HEALTH INFORMATION MANAGEMENT | Facility: CLINIC | Age: 86
End: 2021-05-19

## 2021-06-08 ENCOUNTER — ONCOLOGY VISIT (OUTPATIENT)
Dept: ONCOLOGY | Facility: CLINIC | Age: 86
End: 2021-06-08
Attending: INTERNAL MEDICINE
Payer: MEDICARE

## 2021-06-08 ENCOUNTER — PRE VISIT (OUTPATIENT)
Dept: ONCOLOGY | Facility: CLINIC | Age: 86
End: 2021-06-08

## 2021-06-08 VITALS
HEART RATE: 77 BPM | TEMPERATURE: 97.4 F | SYSTOLIC BLOOD PRESSURE: 161 MMHG | BODY MASS INDEX: 24.03 KG/M2 | HEIGHT: 61 IN | RESPIRATION RATE: 16 BRPM | DIASTOLIC BLOOD PRESSURE: 73 MMHG | OXYGEN SATURATION: 96 % | WEIGHT: 127.3 LBS

## 2021-06-08 DIAGNOSIS — I26.99 OTHER PULMONARY EMBOLISM WITHOUT ACUTE COR PULMONALE, UNSPECIFIED CHRONICITY (H): ICD-10-CM

## 2021-06-08 PROCEDURE — 99203 OFFICE O/P NEW LOW 30 MIN: CPT | Performed by: INTERNAL MEDICINE

## 2021-06-08 PROCEDURE — G0463 HOSPITAL OUTPT CLINIC VISIT: HCPCS

## 2021-06-08 ASSESSMENT — PAIN SCALES - GENERAL: PAINLEVEL: NO PAIN (0)

## 2021-06-08 ASSESSMENT — MIFFLIN-ST. JEOR: SCORE: 921.87

## 2021-06-08 NOTE — LETTER
"    6/8/2021         RE: Geni Lindsay  [address]  [place] [address]        Dear Colleague,    Thank you for referring your patient, Geni Lindsay, to the [facility]. Please see a copy of my visit note below.    Oncology Rooming Note    June 8, 2021 1:36 PM   Geni Lindsay is a 89 year old female who presents for:    Chief Complaint   Patient presents with     Oncology Clinic Visit     Pulmonary embolism     Initial Vitals: There were no vitals taken for this visit. Estimated body mass index is 21.28 kg/m  as calculated from the following:    Height as of 8/24/20: 1.651 m (5' 5\").    Weight as of 8/25/20: 58 kg (127 lb 14.4 oz). There is no height or weight on file to calculate BSA.  Data Unavailable Comment: Data Unavailable   No LMP recorded.  Allergies reviewed: Yes  Medications reviewed: yes    Medications: Medication refills not needed today.  Pharmacy name entered into [employer]: [id number] - [place], [place] - [address] [place] [address] [address] [facility] [address]    Clinical concerns: None       Christiana Puri MA                [facility]    Hematology/Oncology New Patient Note      Today's Date: 06/08/21    Reason for Consult: pulmonary embolism      HISTORY OF PRESENT ILLNESS: Geni Lindsay is a 89 year old female with PMHx of pulmonary hypertension, who presents with pulmonary embolism.   She had been admitted to [facility] 8/24/20-8/27/20 with right-sided chest pain.  She was hemodynamically stable without hypoxia.   CT chest on 8/24/20 showed pulmonary embolism with minimal clot burden, with single embolus in the right middle lobe.  There was a small right pleural effusion and right middle lobe consolidation suspicious for pulmonary infarct.  She was started on heparin drip and discharged on Xarelto 20 mg daily.      She notes that she has had some GI upset that may be from Xarelto.  She " denies any bleeding symptoms.  She notes that it is very expensive.      She denies family history of blood clots.  She does not smoke or use estrogen supplements.  She denies infections, travel, or surgery prior to the episode of pulmonary embolism.              REVIEW OF SYSTEMS:   14 point ROS was reviewed and is negative other than as noted above in HPI.       HOME MEDICATIONS:  Current Outpatient Medications   Medication Sig Dispense Refill     magnesium oxide (MAG-OX) 400 MG tablet        rivaroxaban ANTICOAGULANT (XARELTO) 20 MG TABS tablet Take 1 tablet (20 mg) by mouth daily (with dinner) 90 tablet 1     acetaminophen (TYLENOL) 325 MG tablet Take 325-650 mg by mouth every 6 hours as needed for mild pain           ALLERGIES:  Allergies   Allergen Reactions     Sulfa Drugs          PAST MEDICAL HISTORY:  Past Medical History:   Diagnosis Date     PHT (pulmonary hypertension) (H)      Pulmonary emboli (H)          PAST SURGICAL HISTORY:  No past surgical history on file.      SOCIAL HISTORY:  Social History     Socioeconomic History     Marital status:      Spouse name: Not on file     Number of children: Not on file     Years of education: Not on file     Highest education level: Not on file   Occupational History     Not on file   Social Needs     Financial resource strain: Not on file     Food insecurity     Worry: Not on file     Inability: Not on file     Transportation needs     Medical: Not on file     Non-medical: Not on file   Tobacco Use     Smoking status: Never Smoker     Smokeless tobacco: Never Used   Substance and Sexual Activity     Alcohol use: Never     Frequency: Never     Drug use: Never     Sexual activity: Not Currently   Lifestyle     Physical activity     Days per week: Not on file     Minutes per session: Not on file     Stress: Not on file   Relationships     Social connections     Talks on phone: Not on file     Gets together: Not on file     Attends [employer] service: Not on  "file     Active member of club or organization: Not on file     Attends meetings of clubs or organizations: Not on file     Relationship status: Not on file     Intimate partner violence     Fear of current or ex partner: Not on file     Emotionally abused: Not on file     Physically abused: Not on file     Forced sexual activity: Not on file   Other Topics Concern     Not on file   Social History Narrative     Not on file         FAMILY HISTORY:  No family history on file.      PHYSICAL EXAM:  Vital signs:  BP (!) 161/73   Pulse 77   Temp 97.4  F (36.3  C) (Oral)   Resp 16   Ht 1.537 m (5' 0.5\")   Wt 57.7 kg (127 lb 4.8 oz)   SpO2 96%   BMI 24.45 kg/m     ECO  GENERAL/CONSTITUTIONAL: No acute distress. Accompanied by niece.  ENT/MOUTH: Hard of hearing.  NEUROLOGIC: Alert, oriented, answers questions appropriately, but tends to veer off topic at times.  INTEGUMENTARY: No  jaundice.      LABS:  CBC RESULTS:   Recent Labs   Lab Test 20  0702   WBC 14.8*   RBC 3.67*   HGB 11.1*   HCT 35.8   MCV 98   MCH 30.2   MCHC 31.0*   RDW 14.0          Recent Labs   Lab Test 20  0642 20  0702 20  1531   NA  --  135 135   POTASSIUM  --  3.9 3.8   CHLORIDE  --  104 103   CO2  --  27 25   ANIONGAP  --  4 7   GLC  --  95 105*   BUN  --  13 15   CR 0.72 0.89 0.83   DOMINIC  --  8.6 9.8         IMAGING:  CT chest 20:  1.  Pulmonary embolism is present, minimal clot burden with single embolus in the right middle lobe. No evidence for elevated right heart pressure. There may be some underlying pre-existing pulmonary hypertension.  2.  Small right pleural effusion and right middle lobe consolidation suspicious for pulmonary infarct. Pneumonia considered less likely.      ASSESSMENT/PLAN:  Geni Lindsay is a 89 year old female with:    Pulmonary embolism: Diagnosed in 20, with single embolus in the right middle lobe, minimal clot burden.  It was unprovoked, as she had no obvious risk " factors.  She has been on anticoagulation since then, currently on Xarelto 20 mg daily.  She prefers to stop the medication.  She notes a fall last year, but she doesn't fall frequently.  I discussed option of checking hypercoagulable work-up, as it was an unprovoked clot, but also okay to forgo it at her age, if she desires to stop the medication, weighing the benefits versus risk of anticoagulation at her advanced age.  She will finish out her bottle on 6/20/21, and so she would have completed 10 months of anticoagulation, and it would be reasonable to stop.  If she gets another blood clot, then she will need to be on anticoagulation for life.  We discussed symptoms to watch for.  She is agreeable with this plan.  She can follow-up with hematology as needed.    Danielle Orellana MD  Hematology/Oncology  [facility]      Total time spent on day of visit, including review of tests, obtaining/reviewing separately obtained history, ordering medications/tests/procedures, communicating with PCP/consultants, and documenting in electronic medical record: 30 minutes        Again, thank you for allowing me to participate in the care of your patient.        Sincerely,        Danielle Orellana MD

## 2021-06-08 NOTE — PROGRESS NOTES
[facility]    Hematology/Oncology New Patient Note      Today's Date: 06/08/21    Reason for Consult: pulmonary embolism      HISTORY OF PRESENT ILLNESS: Geni Lindsay is a 89 year old female with PMHx of pulmonary hypertension, who presents with pulmonary embolism.   She had been admitted to [facility] 8/24/20-8/27/20 with right-sided chest pain.  She was hemodynamically stable without hypoxia.   CT chest on 8/24/20 showed pulmonary embolism with minimal clot burden, with single embolus in the right middle lobe.  There was a small right pleural effusion and right middle lobe consolidation suspicious for pulmonary infarct.  She was started on heparin drip and discharged on Xarelto 20 mg daily.      She notes that she has had some GI upset that may be from Xarelto.  She denies any bleeding symptoms.  She notes that it is very expensive.      She denies family history of blood clots.  She does not smoke or use estrogen supplements.  She denies infections, travel, or surgery prior to the episode of pulmonary embolism.              REVIEW OF SYSTEMS:   14 point ROS was reviewed and is negative other than as noted above in HPI.       HOME MEDICATIONS:  Current Outpatient Medications   Medication Sig Dispense Refill     magnesium oxide (MAG-OX) 400 MG tablet        rivaroxaban ANTICOAGULANT (XARELTO) 20 MG TABS tablet Take 1 tablet (20 mg) by mouth daily (with dinner) 90 tablet 1     acetaminophen (TYLENOL) 325 MG tablet Take 325-650 mg by mouth every 6 hours as needed for mild pain           ALLERGIES:  Allergies   Allergen Reactions     Sulfa Drugs          PAST MEDICAL HISTORY:  Past Medical History:   Diagnosis Date     PHT (pulmonary hypertension) (H)      Pulmonary emboli (H)          PAST SURGICAL HISTORY:  No past surgical history on file.      SOCIAL HISTORY:  Social History     Socioeconomic History     Marital status:      Spouse name: Not on file     Number of  "children: Not on file     Years of education: Not on file     Highest education level: Not on file   Occupational History     Not on file   Social Needs     Financial resource strain: Not on file     Food insecurity     Worry: Not on file     Inability: Not on file     Transportation needs     Medical: Not on file     Non-medical: Not on file   Tobacco Use     Smoking status: Never Smoker     Smokeless tobacco: Never Used   Substance and Sexual Activity     Alcohol use: Never     Frequency: Never     Drug use: Never     Sexual activity: Not Currently   Lifestyle     Physical activity     Days per week: Not on file     Minutes per session: Not on file     Stress: Not on file   Relationships     Social connections     Talks on phone: Not on file     Gets together: Not on file     Attends [employer] service: Not on file     Active member of club or organization: Not on file     Attends meetings of clubs or organizations: Not on file     Relationship status: Not on file     Intimate partner violence     Fear of current or ex partner: Not on file     Emotionally abused: Not on file     Physically abused: Not on file     Forced sexual activity: Not on file   Other Topics Concern     Not on file   Social History Narrative     Not on file         FAMILY HISTORY:  No family history on file.      PHYSICAL EXAM:  Vital signs:  BP (!) 161/73   Pulse 77   Temp 97.4  F (36.3  C) (Oral)   Resp 16   Ht 1.537 m (5' 0.5\")   Wt 57.7 kg (127 lb 4.8 oz)   SpO2 96%   BMI 24.45 kg/m     ECO  GENERAL/CONSTITUTIONAL: No acute distress. Accompanied by niece.  ENT/MOUTH: Hard of hearing.  NEUROLOGIC: Alert, oriented, answers questions appropriately, but tends to veer off topic at times.  INTEGUMENTARY: No  jaundice.      LABS:  CBC RESULTS:   Recent Labs   Lab Test 20  0702   WBC 14.8*   RBC 3.67*   HGB 11.1*   HCT 35.8   MCV 98   MCH 30.2   MCHC 31.0*   RDW 14.0          Recent Labs   Lab Test 20  0642 " 08/25/20  0702 08/24/20  1531   NA  --  135 135   POTASSIUM  --  3.9 3.8   CHLORIDE  --  104 103   CO2  --  27 25   ANIONGAP  --  4 7   GLC  --  95 105*   BUN  --  13 15   CR 0.72 0.89 0.83   DOMINIC  --  8.6 9.8         IMAGING:  CT chest 8/24/20:  1.  Pulmonary embolism is present, minimal clot burden with single embolus in the right middle lobe. No evidence for elevated right heart pressure. There may be some underlying pre-existing pulmonary hypertension.  2.  Small right pleural effusion and right middle lobe consolidation suspicious for pulmonary infarct. Pneumonia considered less likely.      ASSESSMENT/PLAN:  Geni Lindsay is a 89 year old female with:    Pulmonary embolism: Diagnosed in 8/24/20, with single embolus in the right middle lobe, minimal clot burden.  It was unprovoked, as she had no obvious risk factors.  She has been on anticoagulation since then, currently on Xarelto 20 mg daily.  She prefers to stop the medication.  She notes a fall last year, but she doesn't fall frequently.  I discussed option of checking hypercoagulable work-up, as it was an unprovoked clot, but also okay to forgo it at her age, if she desires to stop the medication, weighing the benefits versus risk of anticoagulation at her advanced age.  She will finish out her bottle on 6/20/21, and so she would have completed 10 months of anticoagulation, and it would be reasonable to stop.  If she gets another blood clot, then she will need to be on anticoagulation for life.  We discussed symptoms to watch for.  She is agreeable with this plan.  She can follow-up with hematology as needed.    Danielle Orellana MD  Hematology/Oncology  [facility]      Total time spent on day of visit, including review of tests, obtaining/reviewing separately obtained history, ordering medications/tests/procedures, communicating with PCP/consultants, and documenting in electronic medical record: 30 minutes

## 2021-06-08 NOTE — PROGRESS NOTES
"Oncology Rooming Note    June 8, 2021 1:36 PM   Geni Lindsay is a 89 year old female who presents for:    Chief Complaint   Patient presents with     Oncology Clinic Visit     Pulmonary embolism     Initial Vitals: There were no vitals taken for this visit. Estimated body mass index is 21.28 kg/m  as calculated from the following:    Height as of 8/24/20: 1.651 m (5' 5\").    Weight as of 8/25/20: 58 kg (127 lb 14.4 oz). There is no height or weight on file to calculate BSA.  Data Unavailable Comment: Data Unavailable   No LMP recorded.  Allergies reviewed: Yes  Medications reviewed: yes    Medications: Medication refills not needed today.  Pharmacy name entered into [employer]: [id number] - [place], [place] - [address] [place] [address] [address] [facility] [address]    Clinical concerns: None       Christiana Puri MA              "

## 2021-06-18 ENCOUNTER — TELEPHONE (OUTPATIENT)
Dept: INTERNAL MEDICINE | Facility: CLINIC | Age: 86
End: 2021-06-18

## 2021-06-18 PROBLEM — I26.99 OTHER PULMONARY EMBOLISM WITHOUT ACUTE COR PULMONALE, UNSPECIFIED CHRONICITY (H): Status: ACTIVE | Noted: 2020-08-27

## 2021-06-18 NOTE — TELEPHONE ENCOUNTER
Stop xarelto as per hematology opinion.  I don't see ant recommendation about ASA in hematology notes.  She could check in their office.    Jeromy Frausto MD

## 2021-06-18 NOTE — TELEPHONE ENCOUNTER
Pt calling wondering is she needs to stop the xarelto.  Per oncology visit she can stop but if she gets a clot again then she will be on it life long.    Pt would like to know if she should be on a baby aspirin and when does she need a follow up appt.    Please advise  Jose Sofia RN, BSN

## 2021-06-18 NOTE — TELEPHONE ENCOUNTER
Call to pt and advised. She verbalized understanding. She will establish care sometime in the future with a new provider.

## 2021-10-11 ENCOUNTER — HEALTH MAINTENANCE LETTER (OUTPATIENT)
Age: 86
End: 2021-10-11

## 2022-03-27 ENCOUNTER — HEALTH MAINTENANCE LETTER (OUTPATIENT)
Age: 87
End: 2022-03-27

## 2022-07-19 NOTE — PROGRESS NOTES
"   08/25/20 1537   Quick Adds   Type of Visit Initial PT Evaluation   Living Environment   Lives With alone   Living Arrangements apartment   Home Accessibility no concerns   Transportation Anticipated family or friend will provide   Living Environment Comment lives in an apt alone   Self-Care   Usual Activity Tolerance good   Current Activity Tolerance poor   Equipment Currently Used at Home walker, rolling  (4WW)   Activity/Exercise/Self-Care Comment normally able to care for self ; use of 4WW for mobility   Functional Level Prior   Ambulation 1-->assistive equipment  (4WW)   Transferring 1-->assistive equipment  (4WW)   Cognition 0 - no cognition issues reported   Fall history within last six months yes   Number of times patient has fallen within last six months 2  (per chart; patient only reports 1)   Which of the above functional risks had a recent onset or change? ambulation;transferring;toileting;bathing;dressing;fall history   Prior Functional Level Comment normally independent with use of a rolling walker   General Information   Onset of Illness/Injury or Date of Surgery - Date 08/24/20   Referring Physician Klein, Jada Hernandez PA-C    Patient/Family Goals Statement return home if pain is better   Pertinent History of Current Problem (include personal factors and/or comorbidities that impact the POC) patient admitted for R sided chest pain and found to have \"Small right pleural effusion and adjacent atelectasis. Mild groundglass density within the right middle lobe suspicious for infarct. No pulmonary mass. No suspicious pulmonary nodule. No pleural effusion or pneumothorax\". See medical record for further information   Precautions/Limitations fall precautions   General Observations patient in bed; O2 sats stable   General Info Comments Limited by pain; R side of chest   Cognitive Status Examination   Orientation orientation to person, place and time   Level of Consciousness alert   Follows Commands and " "Answers Questions 100% of the time   Personal Safety and Judgment intact   Memory intact   Cognitive Comment memory appears intact during PT session   Pain Assessment   Patient Currently in Pain Yes, see Vital Sign flowsheet  (R sided chest pain)   Range of Motion (ROM)   ROM Comment pain in R side of chest with movement of R UE or trunk   Strength   Strength Comments functional weakness noted with mobility; likely limited by pain   Bed Mobility   Bed Mobility Comments min/mod A at shoulders to come to sitting   Transfer Skills   Transfer Comments sit>stand with CGA and safety cues; use of walker   Gait   Gait Comments able to ambulate in room > 10 feet with a walker and CGA; limited by pain   Balance   Balance Comments baseline impairment; 2 recent falls   General Therapy Interventions   Planned Therapy Interventions bed mobility training;gait training;strengthening;progressive activity/exercise;transfer training   Clinical Impression   Criteria for Skilled Therapeutic Intervention yes, treatment indicated   PT Diagnosis impaired gait/transfers   Influenced by the following impairments functional weakness; decreased activity tolerance; pain   Functional limitations due to impairments impaired independence with functional mobility   Clinical Presentation Evolving/Changing   Clinical Presentation Rationale clinical judgement; level of assist   Clinical Decision Making (Complexity) Low complexity   Therapy Frequency Daily   Predicted Duration of Therapy Intervention (days/wks) 4 days   Anticipated Equipment Needs at Discharge front wheeled walker   Anticipated Discharge Disposition Transitional Care Facility   Risk & Benefits of therapy have been explained Yes   Patient, Family & other staff in agreement with plan of care Yes   [facility] AM-PAC TM \"6 Clicks\"   2016, Trustees of [facility], under license to [employer].  All rights reserved.   6 Clicks Short Forms Basic Mobility Inpatient Short Form " "  [facility] AM-PAC  \"6 Clicks\" V.2 Basic Mobility Inpatient Short Form   1. Turning from your back to your side while in a flat bed without using bedrails? 2 - A Lot   2. Moving from lying on your back to sitting on the side of a flat bed without using bedrails? 2 - A Lot   3. Moving to and from a bed to a chair (including a wheelchair)? 3 - A Little   4. Standing up from a chair using your arms (e.g., wheelchair, or bedside chair)? 3 - A Little   5. To walk in hospital room? 3 - A Little   6. Climbing 3-5 steps with a railing? 2 - A Lot   Basic Mobility Raw Score (Score out of 24.Lower scores equate to lower levels of function) 15   Total Evaluation Time   Total Evaluation Time (Minutes) 10     " Billing Type: Third-Party Bill Performing Laboratory: 0 Bill For Surgical Tray: no Expected Date Of Service: 07/19/2022

## 2022-09-24 ENCOUNTER — HEALTH MAINTENANCE LETTER (OUTPATIENT)
Age: 87
End: 2022-09-24

## 2023-05-08 ENCOUNTER — HEALTH MAINTENANCE LETTER (OUTPATIENT)
Age: 88
End: 2023-05-08